# Patient Record
Sex: MALE | Race: WHITE | NOT HISPANIC OR LATINO | Employment: UNEMPLOYED | URBAN - METROPOLITAN AREA
[De-identification: names, ages, dates, MRNs, and addresses within clinical notes are randomized per-mention and may not be internally consistent; named-entity substitution may affect disease eponyms.]

---

## 2017-05-24 ENCOUNTER — TELEPHONE (OUTPATIENT)
Dept: NEUROLOGY | Facility: CLINIC | Age: 52
End: 2017-05-24

## 2017-05-24 NOTE — TELEPHONE ENCOUNTER
----- Message from Dunia Segundo sent at 5/24/2017  1:07 PM CDT -----  Contact: Patient  Patient would be a new patient, he has made an appointment to establish care, he has multiple issues, and would like to explain what the problems are that he feels he needs to be seen as soon as possible. Please call him back at [phone]. Thank you

## 2017-05-29 ENCOUNTER — OFFICE VISIT (OUTPATIENT)
Dept: INTERNAL MEDICINE | Facility: CLINIC | Age: 52
End: 2017-05-29
Payer: COMMERCIAL

## 2017-05-29 VITALS
HEIGHT: 71 IN | TEMPERATURE: 98 F | HEART RATE: 70 BPM | WEIGHT: 229.75 LBS | BODY MASS INDEX: 32.16 KG/M2 | OXYGEN SATURATION: 96 % | DIASTOLIC BLOOD PRESSURE: 71 MMHG | SYSTOLIC BLOOD PRESSURE: 111 MMHG

## 2017-05-29 DIAGNOSIS — G43.709 CHRONIC MIGRAINE WITHOUT AURA WITHOUT STATUS MIGRAINOSUS, NOT INTRACTABLE: ICD-10-CM

## 2017-05-29 DIAGNOSIS — G89.21 CHRONIC PAIN DUE TO TRAUMA: ICD-10-CM

## 2017-05-29 DIAGNOSIS — E78.5 HYPERLIPIDEMIA, UNSPECIFIED HYPERLIPIDEMIA TYPE: ICD-10-CM

## 2017-05-29 DIAGNOSIS — Z11.59 NEED FOR HEPATITIS C SCREENING TEST: ICD-10-CM

## 2017-05-29 DIAGNOSIS — Z76.89 ENCOUNTER TO ESTABLISH CARE WITH NEW DOCTOR: Primary | ICD-10-CM

## 2017-05-29 DIAGNOSIS — Z12.5 SCREENING FOR PROSTATE CANCER: ICD-10-CM

## 2017-05-29 DIAGNOSIS — E11.42 CONTROLLED TYPE 2 DIABETES MELLITUS WITH DIABETIC POLYNEUROPATHY, WITHOUT LONG-TERM CURRENT USE OF INSULIN: ICD-10-CM

## 2017-05-29 DIAGNOSIS — F14.21 COCAINE DEPENDENCE IN REMISSION: ICD-10-CM

## 2017-05-29 DIAGNOSIS — R21 RASH: ICD-10-CM

## 2017-05-29 PROBLEM — F11.21 HISTORY OF NARCOTIC ADDICTION: Status: ACTIVE | Noted: 2017-05-29

## 2017-05-29 PROCEDURE — 85025 COMPLETE CBC W/AUTO DIFF WBC: CPT

## 2017-05-29 PROCEDURE — 86803 HEPATITIS C AB TEST: CPT

## 2017-05-29 PROCEDURE — 80053 COMPREHEN METABOLIC PANEL: CPT

## 2017-05-29 PROCEDURE — 83036 HEMOGLOBIN GLYCOSYLATED A1C: CPT

## 2017-05-29 PROCEDURE — 82570 ASSAY OF URINE CREATININE: CPT

## 2017-05-29 PROCEDURE — 99999 PR PBB SHADOW E&M-EST. PATIENT-LVL V: CPT | Mod: PBBFAC,,, | Performed by: FAMILY MEDICINE

## 2017-05-29 PROCEDURE — 84443 ASSAY THYROID STIM HORMONE: CPT

## 2017-05-29 PROCEDURE — 81003 URINALYSIS AUTO W/O SCOPE: CPT

## 2017-05-29 PROCEDURE — 80061 LIPID PANEL: CPT

## 2017-05-29 PROCEDURE — 82607 VITAMIN B-12: CPT

## 2017-05-29 PROCEDURE — 84153 ASSAY OF PSA TOTAL: CPT

## 2017-05-29 PROCEDURE — 99204 OFFICE O/P NEW MOD 45 MIN: CPT | Mod: S$GLB,,, | Performed by: FAMILY MEDICINE

## 2017-05-29 RX ORDER — ATENOLOL 25 MG/1
25 TABLET ORAL DAILY
Qty: 30 TABLET | Refills: 11 | Status: SHIPPED | OUTPATIENT
Start: 2017-05-29 | End: 2018-06-12 | Stop reason: SDUPTHER

## 2017-05-29 RX ORDER — METFORMIN HYDROCHLORIDE 1000 MG/1
1000 TABLET ORAL 2 TIMES DAILY WITH MEALS
COMMUNITY
End: 2017-05-29 | Stop reason: SDUPTHER

## 2017-05-29 RX ORDER — GLIPIZIDE 5 MG/1
5 TABLET, FILM COATED, EXTENDED RELEASE ORAL
Qty: 30 TABLET | Refills: 3 | Status: SHIPPED | OUTPATIENT
Start: 2017-05-29 | End: 2018-07-09 | Stop reason: SDUPTHER

## 2017-05-29 RX ORDER — BUSPIRONE HYDROCHLORIDE 15 MG/1
15 TABLET ORAL
Refills: 0 | COMMUNITY
Start: 2017-05-24 | End: 2017-05-29 | Stop reason: SDUPTHER

## 2017-05-29 RX ORDER — CLOTRIMAZOLE 1 %
CREAM (GRAM) TOPICAL 2 TIMES DAILY
COMMUNITY
End: 2023-03-13

## 2017-05-29 RX ORDER — FENOFIBRATE 145 MG/1
145 TABLET, FILM COATED ORAL DAILY
Qty: 30 TABLET | Refills: 3 | Status: SHIPPED | OUTPATIENT
Start: 2017-05-29 | End: 2023-03-06

## 2017-05-29 RX ORDER — IBUPROFEN 800 MG/1
800 TABLET ORAL EVERY 6 HOURS PRN
Qty: 30 TABLET | Refills: 2 | Status: SHIPPED | OUTPATIENT
Start: 2017-05-29 | End: 2023-03-13

## 2017-05-29 RX ORDER — FENOFIBRATE 145 MG/1
145 TABLET, FILM COATED ORAL DAILY
Refills: 0 | COMMUNITY
Start: 2017-05-24 | End: 2017-05-29 | Stop reason: SDUPTHER

## 2017-05-29 RX ORDER — ATORVASTATIN CALCIUM 20 MG/1
20 TABLET, FILM COATED ORAL DAILY
COMMUNITY
End: 2017-05-29 | Stop reason: SDUPTHER

## 2017-05-29 RX ORDER — DICLOFENAC SODIUM 75 MG/1
75 TABLET, DELAYED RELEASE ORAL 2 TIMES DAILY
COMMUNITY
End: 2023-03-13

## 2017-05-29 RX ORDER — BETAMETHASONE VALERATE 1.2 MG/G
CREAM TOPICAL 2 TIMES DAILY
COMMUNITY
End: 2023-03-13

## 2017-05-29 RX ORDER — CEPHALEXIN 500 MG/1
500 CAPSULE ORAL DAILY
COMMUNITY
End: 2023-03-13

## 2017-05-29 RX ORDER — SUMATRIPTAN 50 MG/1
50 TABLET, FILM COATED ORAL
Refills: 0 | COMMUNITY
Start: 2017-05-22 | End: 2022-07-22

## 2017-05-29 RX ORDER — METFORMIN HYDROCHLORIDE 1000 MG/1
1000 TABLET ORAL 2 TIMES DAILY WITH MEALS
Qty: 60 TABLET | Refills: 3 | Status: SHIPPED | OUTPATIENT
Start: 2017-05-29 | End: 2018-07-09 | Stop reason: SDUPTHER

## 2017-05-29 RX ORDER — WITCH HAZEL 50 %
2000 PADS, MEDICATED (EA) TOPICAL 2 TIMES DAILY
COMMUNITY
End: 2023-03-06

## 2017-05-29 RX ORDER — TIZANIDINE 4 MG/1
4 TABLET ORAL EVERY 6 HOURS PRN
COMMUNITY
End: 2023-03-27 | Stop reason: CLARIF

## 2017-05-29 RX ORDER — BUSPIRONE HYDROCHLORIDE 15 MG/1
15 TABLET ORAL 2 TIMES DAILY
Qty: 60 TABLET | Refills: 3 | Status: SHIPPED | OUTPATIENT
Start: 2017-05-29 | End: 2023-03-13

## 2017-05-29 RX ORDER — GLIPIZIDE 5 MG/1
5 TABLET, FILM COATED, EXTENDED RELEASE ORAL
COMMUNITY
End: 2017-05-29 | Stop reason: SDUPTHER

## 2017-05-29 RX ORDER — AMOXICILLIN 500 MG
1 CAPSULE ORAL DAILY
COMMUNITY

## 2017-05-29 RX ORDER — ATORVASTATIN CALCIUM 20 MG/1
20 TABLET, FILM COATED ORAL DAILY
Qty: 30 TABLET | Refills: 3 | Status: SHIPPED | OUTPATIENT
Start: 2017-05-29 | End: 2023-03-06

## 2017-05-29 NOTE — PROGRESS NOTES
Subjective:       Patient ID: Reynaldo Chong is a 52 y.o. male.    Chief Complaint: Establish Care and Diabetes    Establish care.  No history includes chronic migraine headaches, diabetes mellitus, hyperlipidemia, previous trauma resulting in cervical fracture, left leg sciatica, cocaine addiction.  He is scheduled see neurology regards chronic migraine headaches.  He currently using Imitrex as needed.  He reports intermittent numbness of his feet.  His follow-up.  Diabetic neuropathy.  He was under pain management for trauma related injuries in 2015.  He was OxyContin.  He last took OxyContin November 2016.  He was recently discharged from cocaine addiction program.  He has a chronic rash that has been treated with Keflex 500 mg daily Lotrimin cream twice a day.  He reports the onset of the rash was in the sacral region during the time he was in prison.      Diabetes   Hypoglycemia symptoms include headaches. Pertinent negatives for hypoglycemia include no confusion, dizziness, nervousness/anxiousness or tremors. Pertinent negatives for diabetes include no chest pain, no fatigue, no polydipsia and no polyuria.     Review of Systems   Constitutional: Negative for activity change, appetite change, fatigue and unexpected weight change.   HENT: Negative for congestion, ear pain, hearing loss, sneezing, sore throat, tinnitus and trouble swallowing.    Eyes: Negative for pain, redness and visual disturbance.   Respiratory: Negative for cough, chest tightness, shortness of breath and wheezing.    Cardiovascular: Negative for chest pain, palpitations and leg swelling.   Gastrointestinal: Negative for abdominal distention, abdominal pain, blood in stool, constipation, diarrhea, nausea, rectal pain and vomiting.   Endocrine: Negative for polydipsia and polyuria.   Genitourinary: Negative for difficulty urinating, dysuria, frequency, hematuria and urgency.   Musculoskeletal: Positive for arthralgias and neck pain. Negative  for back pain, joint swelling, myalgias and neck stiffness.   Skin: Positive for rash. Negative for wound.   Neurological: Positive for headaches. Negative for dizziness, tremors, syncope, light-headedness and numbness.   Hematological: Negative for adenopathy. Does not bruise/bleed easily.   Psychiatric/Behavioral: Negative for agitation, confusion, dysphoric mood and sleep disturbance. The patient is not nervous/anxious.        Objective:      Physical Exam   Constitutional: He is oriented to person, place, and time. He appears well-developed and well-nourished.   HENT:   Right Ear: External ear normal.   Left Ear: External ear normal.   Nose: Nose normal.   Mouth/Throat: Oropharynx is clear and moist.   Eyes: Conjunctivae and EOM are normal. Pupils are equal, round, and reactive to light.   Neck: No JVD present. No thyromegaly present.   Cardiovascular: Normal rate, regular rhythm, normal heart sounds and intact distal pulses.  Exam reveals no gallop and no friction rub.    No murmur heard.  Pulses:       Dorsalis pedis pulses are 2+ on the right side, and 2+ on the left side.   Pulmonary/Chest: Effort normal and breath sounds normal. No respiratory distress. He has no wheezes. He has no rales.   Abdominal: Soft. Bowel sounds are normal. He exhibits no distension and no mass. There is no tenderness.   Musculoskeletal: Normal range of motion. He exhibits no edema or tenderness.        Right foot: There is normal range of motion and no deformity.        Left foot: There is normal range of motion and no deformity.   Feet:   Right Foot:   Protective Sensation: 10 sites tested. 4 sites sensed.   Skin Integrity: Positive for dry skin. Negative for ulcer, blister, skin breakdown, erythema, warmth or callus.   Left Foot:   Protective Sensation: 10 sites tested. 4 sites sensed.   Skin Integrity: Positive for dry skin. Negative for ulcer, blister, skin breakdown, erythema, warmth or callus.   Lymphadenopathy:     He has no  cervical adenopathy.   Neurological: He is alert and oriented to person, place, and time. He has normal reflexes. He displays normal reflexes. No cranial nerve deficit. He exhibits normal muscle tone. Coordination normal.   Skin: Skin is warm and dry. Rash noted.   He has a maculopapular type rash involving the occipital region, sacrum, right dorsal hand.   Psychiatric: He has a normal mood and affect. His behavior is normal. Judgment and thought content normal.       Lab Visit on 09/05/2013   Component Date Value Ref Range Status    Sodium 09/05/2013 138  136 - 145 mmol/L Final    Potassium 09/05/2013 4.9  3.5 - 5.1 mmol/L Final    Chloride 09/05/2013 101  95 - 110 mmol/L Final    CO2 09/05/2013 29  23 - 29 mmol/L Final    Glucose 09/05/2013 411* 70 - 110 mg/dL Final    BUN, Bld 09/05/2013 14  6 - 20 mg/dL Final    Creatinine 09/05/2013 1.2  0.5 - 1.4 mg/dL Final    Calcium 09/05/2013 10.2  8.7 - 10.5 mg/dL Final    Anion Gap 09/05/2013 8  8 - 16 mmol/L Final    eGFR if African American 09/05/2013 >60  >60 mL/min/1.73 m^2 Final    eGFR if non African American 09/05/2013 >60  >60 mL/min/1.73 m^2 Final    WBC 09/05/2013 9.20  3.90 - 12.70 K/uL Final    RBC 09/05/2013 4.85  4.60 - 6.20 M/uL Final    Hemoglobin 09/05/2013 14.6  14.0 - 18.0 g/dL Final    Hematocrit 09/05/2013 42.4  40.0 - 54.0 % Final    MCV 09/05/2013 87  82 - 98 fL Final    MCH 09/05/2013 30.1  27.0 - 31.0 pg Final    MCHC 09/05/2013 34.4  32.0 - 36.0 % Final    RDW 09/05/2013 12.8  11.5 - 14.5 % Final    Platelets 09/05/2013 318  150 - 350 K/uL Final    MPV 09/05/2013 10.8  9.2 - 12.9 fL Final    Gran # 09/05/2013 5.8  1.8 - 7.7 K/uL Final    Lymph # 09/05/2013 2.4  1.0 - 4.8 K/uL Final    Mono # 09/05/2013 0.7  0.3 - 1.0 K/uL Final    Eos # 09/05/2013 0.3  0.0 - 0.5 K/uL Final    Baso # 09/05/2013 0.03  0.00 - 0.20 K/uL Final    Gran% 09/05/2013 63.6  38.0 - 73.0 % Final    Lymph% 09/05/2013 25.6  18.0 - 48.0 % Final     Mono% 09/05/2013 7.5  4.0 - 15.0 % Final    Eosinophil% 09/05/2013 3.0  0.0 - 8.0 % Final    Basophil% 09/05/2013 0.3  0.0 - 1.9 % Final    Differential Method 09/05/2013 Automated   Final     Assessment:       1. Controlled type 2 diabetes mellitus with diabetic polyneuropathy, without long-term current use of insulin    2. Hyperlipidemia, unspecified hyperlipidemia type    3. Chronic migraine without aura without status migrainosus, not intractable    4. Rash    5. History of narcotic addiction    6. Screening for prostate cancer        Plan:   Medications were reviewed and reordered.  Recommend discontinuing Keflex.  Referral to dermatology.  Consider scabies since the initial occurrence was in prison,.  Ibuprofen as needed for migraine headache.  Atenolol 25 mg daily for possible migraine prevention.  Lab was ordered.  Optometry referral for routine diabetic eye examination.  Follow-up in one month.  He's probably developing early diabetic neuropathy of feet.  Observation for now.      Controlled type 2 diabetes mellitus with diabetic polyneuropathy, without long-term current use of insulin  -     metformin (GLUCOPHAGE) 1000 MG tablet; Take 1 tablet (1,000 mg total) by mouth 2 (two) times daily with meals.  Dispense: 60 tablet; Refill: 3  -     glipiZIDE (GLUCOTROL) 5 MG TR24; Take 1 tablet (5 mg total) by mouth daily with breakfast.  Dispense: 30 tablet; Refill: 3  -     Cancel: Ambulatory referral to Optometry  -     CBC auto differential  -     Urinalysis  -     Comprehensive metabolic panel  -     Lipid panel  -     TSH  -     Hemoglobin A1c  -     Microalbumin/creatinine urine ratio  -     Vitamin B12  -     Ambulatory Referral to Optometry    Hyperlipidemia, unspecified hyperlipidemia type  -     fenofibrate (TRICOR) 145 MG tablet; Take 1 tablet (145 mg total) by mouth once daily at 6am.  Dispense: 30 tablet; Refill: 3  -     atorvastatin (LIPITOR) 20 MG tablet; Take 1 tablet (20 mg total) by mouth once  daily.  Dispense: 30 tablet; Refill: 3    Chronic migraine without aura without status migrainosus, not intractable  -     busPIRone (BUSPAR) 15 MG tablet; Take 1 tablet (15 mg total) by mouth 2 (two) times daily.  Dispense: 60 tablet; Refill: 3  -     atenolol (TENORMIN) 25 MG tablet; Take 1 tablet (25 mg total) by mouth once daily.  Dispense: 30 tablet; Refill: 11  -     ibuprofen (ADVIL,MOTRIN) 800 MG tablet; Take 1 tablet (800 mg total) by mouth every 6 (six) hours as needed for Pain.  Dispense: 30 tablet; Refill: 2    Rash  -     Cancel: Ambulatory referral to Dermatology  -     Cancel: Ambulatory referral to Dermatology  -     Ambulatory referral to Dermatology    History of narcotic addiction    Screening for prostate cancer  -     PSA, Screening

## 2017-05-31 ENCOUNTER — TELEPHONE (OUTPATIENT)
Dept: INTERNAL MEDICINE | Facility: CLINIC | Age: 52
End: 2017-05-31

## 2017-05-31 LAB
ALBUMIN SERPL BCP-MCNC: 4 G/DL
ALP SERPL-CCNC: 57 U/L
ALT SERPL W/O P-5'-P-CCNC: 12 U/L
ANION GAP SERPL CALC-SCNC: 7 MMOL/L
AST SERPL-CCNC: 18 U/L
BASOPHILS # BLD AUTO: 0.04 K/UL
BASOPHILS NFR BLD: 0.4 %
BILIRUB SERPL-MCNC: 0.3 MG/DL
BILIRUB UR QL STRIP: NEGATIVE
BUN SERPL-MCNC: 11 MG/DL
CALCIUM SERPL-MCNC: 9.9 MG/DL
CHLORIDE SERPL-SCNC: 109 MMOL/L
CHOLEST/HDLC SERPL: 4.1 {RATIO}
CLARITY UR REFRACT.AUTO: CLEAR
CO2 SERPL-SCNC: 27 MMOL/L
COLOR UR AUTO: NORMAL
COMPLEXED PSA SERPL-MCNC: 0.38 NG/ML
CREAT SERPL-MCNC: 0.9 MG/DL
CREAT UR-MCNC: 29 MG/DL
DIFFERENTIAL METHOD: ABNORMAL
EOSINOPHIL # BLD AUTO: 0.3 K/UL
EOSINOPHIL NFR BLD: 3.3 %
ERYTHROCYTE [DISTWIDTH] IN BLOOD BY AUTOMATED COUNT: 13.6 %
EST. GFR  (AFRICAN AMERICAN): >60 ML/MIN/1.73 M^2
EST. GFR  (NON AFRICAN AMERICAN): >60 ML/MIN/1.73 M^2
GLUCOSE SERPL-MCNC: 53 MG/DL
GLUCOSE UR QL STRIP: NEGATIVE
HCT VFR BLD AUTO: 42.7 %
HDL/CHOLESTEROL RATIO: 24.3 %
HDLC SERPL-MCNC: 169 MG/DL
HDLC SERPL-MCNC: 41 MG/DL
HGB BLD-MCNC: 14 G/DL
HGB UR QL STRIP: NEGATIVE
KETONES UR QL STRIP: NEGATIVE
LDLC SERPL CALC-MCNC: 109.6 MG/DL
LEUKOCYTE ESTERASE UR QL STRIP: NEGATIVE
LYMPHOCYTES # BLD AUTO: 2.6 K/UL
LYMPHOCYTES NFR BLD: 28.7 %
MCH RBC QN AUTO: 29.5 PG
MCHC RBC AUTO-ENTMCNC: 32.8 %
MCV RBC AUTO: 90 FL
MICROALBUMIN UR DL<=1MG/L-MCNC: 9 UG/ML
MICROALBUMIN/CREATININE RATIO: 31 UG/MG
MONOCYTES # BLD AUTO: 0.7 K/UL
MONOCYTES NFR BLD: 7.7 %
NEUTROPHILS # BLD AUTO: 5.5 K/UL
NEUTROPHILS NFR BLD: 59.6 %
NITRITE UR QL STRIP: NEGATIVE
NONHDLC SERPL-MCNC: 128 MG/DL
PH UR STRIP: 7 [PH] (ref 5–8)
PLATELET # BLD AUTO: 432 K/UL
PMV BLD AUTO: 11.1 FL
POTASSIUM SERPL-SCNC: 4.2 MMOL/L
PROT SERPL-MCNC: 7.1 G/DL
PROT UR QL STRIP: NEGATIVE
RBC # BLD AUTO: 4.74 M/UL
SODIUM SERPL-SCNC: 143 MMOL/L
SP GR UR STRIP: 1 (ref 1–1.03)
TRIGL SERPL-MCNC: 92 MG/DL
TSH SERPL DL<=0.005 MIU/L-ACNC: 1.29 UIU/ML
URN SPEC COLLECT METH UR: NORMAL
UROBILINOGEN UR STRIP-ACNC: NEGATIVE EU/DL
VIT B12 SERPL-MCNC: 1904 PG/ML
WBC # BLD AUTO: 9.15 K/UL

## 2017-05-31 NOTE — TELEPHONE ENCOUNTER
----- Message from Emili Adamson sent at 5/31/2017  8:19 AM CDT -----  Contact: pt  Please call pt @ [phone], pt states he have nurse visit on 6/8, pt would like to come in this morning, pt states he have time today.

## 2017-05-31 NOTE — TELEPHONE ENCOUNTER
Spoke with pt, states that he wanted to know what he can take without giving him false readings on his drug test at his home he is at. States that he thinks it was the Ibuprofen and Aleve at the same time. Please advise.

## 2017-06-01 ENCOUNTER — TELEPHONE (OUTPATIENT)
Dept: INTERNAL MEDICINE | Facility: CLINIC | Age: 52
End: 2017-06-01

## 2017-06-01 LAB
ESTIMATED AVG GLUCOSE: 160 MG/DL
HBA1C MFR BLD HPLC: 7.2 %
HCV AB SERPL QL IA: NEGATIVE

## 2017-06-01 NOTE — TELEPHONE ENCOUNTER
----- Message from Pepito Cruz MD sent at 6/1/2017 10:34 AM CDT -----  A1c is 7.2 which is mildly elevated, B12 is high in 1904 with  normal at 950  Or less, lipids are normal , urine protein is borderline elevated, rest of lab is normal.  He is to decrease B12 1000 units to take 1 a day and needs to improve diabetic diet.  Otherwise follow-up in 3 months and will repeat lab on return.

## 2017-06-14 ENCOUNTER — TELEPHONE (OUTPATIENT)
Dept: NEUROLOGY | Facility: CLINIC | Age: 52
End: 2017-06-14

## 2017-06-14 ENCOUNTER — TELEPHONE (OUTPATIENT)
Dept: INTERNAL MEDICINE | Facility: CLINIC | Age: 52
End: 2017-06-14

## 2017-06-14 NOTE — TELEPHONE ENCOUNTER
Left message to ask if  He informed them to send over his visit to Dr. Cruz, if not we would need a consent form signed for us to get them.

## 2017-06-14 NOTE — TELEPHONE ENCOUNTER
----- Message from Madiha Batista sent at 6/14/2017 12:55 PM CDT -----  Contact: pt   Pt states that he was at the at ER at [facility] and need to get fitted in to see the doctor for a hospital f/u  for his headaches.    ..[phone] (home)

## 2017-06-14 NOTE — TELEPHONE ENCOUNTER
----- Message from Madiha Batista sent at 6/14/2017 12:50 PM CDT -----  Contact: pt   Pt states that he went to the ER at [facility] and would like the doctor to get his medical reccords so when he come to see him next week.     ..[phone] (home)

## 2017-06-14 NOTE — TELEPHONE ENCOUNTER
Called home number to speak to pt and a lady answered and said they kicked him out ,he had something in his system and they would let him know we called.

## 2017-07-16 ENCOUNTER — NURSE TRIAGE (OUTPATIENT)
Dept: ADMINISTRATIVE | Facility: CLINIC | Age: 52
End: 2017-07-16

## 2017-07-16 NOTE — TELEPHONE ENCOUNTER
Pt moved to [place]. Pt called to cancel appt for am.     Reason for Disposition   [1] Follow-up call to recent contact AND [2] information only call, no triage required    Protocols used: ST INFORMATION ONLY CALL-A-AH

## 2018-02-09 DIAGNOSIS — E11.9 TYPE 2 DIABETES MELLITUS WITHOUT COMPLICATION: ICD-10-CM

## 2018-06-12 DIAGNOSIS — G43.709 CHRONIC MIGRAINE WITHOUT AURA WITHOUT STATUS MIGRAINOSUS, NOT INTRACTABLE: ICD-10-CM

## 2018-06-12 RX ORDER — ATENOLOL 25 MG/1
TABLET ORAL
Qty: 30 TABLET | Refills: 0 | Status: SHIPPED | OUTPATIENT
Start: 2018-06-12 | End: 2018-07-08 | Stop reason: SDUPTHER

## 2018-07-08 DIAGNOSIS — G43.709 CHRONIC MIGRAINE WITHOUT AURA WITHOUT STATUS MIGRAINOSUS, NOT INTRACTABLE: ICD-10-CM

## 2018-07-08 RX ORDER — ATENOLOL 25 MG/1
TABLET ORAL
Qty: 30 TABLET | Refills: 0 | Status: SHIPPED | OUTPATIENT
Start: 2018-07-08 | End: 2018-08-08 | Stop reason: SDUPTHER

## 2018-07-09 DIAGNOSIS — E11.42 CONTROLLED TYPE 2 DIABETES MELLITUS WITH DIABETIC POLYNEUROPATHY, WITHOUT LONG-TERM CURRENT USE OF INSULIN: ICD-10-CM

## 2018-07-09 RX ORDER — METFORMIN HYDROCHLORIDE 1000 MG/1
TABLET ORAL
Qty: 60 TABLET | Refills: 5 | Status: SHIPPED | OUTPATIENT
Start: 2018-07-09 | End: 2024-01-17 | Stop reason: SDUPTHER

## 2018-07-09 RX ORDER — GLIPIZIDE 5 MG/1
TABLET, FILM COATED, EXTENDED RELEASE ORAL
Qty: 30 TABLET | Refills: 5 | Status: SHIPPED | OUTPATIENT
Start: 2018-07-09 | End: 2022-07-22

## 2018-08-08 DIAGNOSIS — G43.709 CHRONIC MIGRAINE WITHOUT AURA WITHOUT STATUS MIGRAINOSUS, NOT INTRACTABLE: ICD-10-CM

## 2018-08-08 RX ORDER — ATENOLOL 25 MG/1
TABLET ORAL
Qty: 30 TABLET | Refills: 5 | Status: SHIPPED | OUTPATIENT
Start: 2018-08-08 | End: 2023-03-06

## 2018-10-05 DIAGNOSIS — G43.709 CHRONIC MIGRAINE WITHOUT AURA WITHOUT STATUS MIGRAINOSUS, NOT INTRACTABLE: ICD-10-CM

## 2018-10-08 RX ORDER — ATENOLOL 25 MG/1
TABLET ORAL
Qty: 30 TABLET | Refills: 0 | OUTPATIENT
Start: 2018-10-08

## 2018-12-05 ENCOUNTER — NEW PATIENT (OUTPATIENT)
Dept: URBAN - METROPOLITAN AREA CLINIC 85 | Facility: CLINIC | Age: 53
End: 2018-12-05
Payer: COMMERCIAL

## 2018-12-05 DIAGNOSIS — Z79.84 LONG TERM (CURRENT) USE OF ORAL ANTIDIABETIC DRUGS: ICD-10-CM

## 2018-12-05 DIAGNOSIS — R51 HEADACHE: Primary | ICD-10-CM

## 2018-12-05 PROCEDURE — 92004 COMPRE OPH EXAM NEW PT 1/>: CPT | Performed by: OPTOMETRIST

## 2018-12-05 ASSESSMENT — KERATOMETRY
OS: 41.63
OD: 41.75

## 2018-12-05 ASSESSMENT — VISUAL ACUITY
OS: 20/20
OD: 20/20

## 2018-12-05 ASSESSMENT — INTRAOCULAR PRESSURE
OD: 12
OS: 12

## 2020-04-21 ENCOUNTER — FOLLOW UP ESTABLISHED (OUTPATIENT)
Dept: URBAN - METROPOLITAN AREA CLINIC 85 | Facility: CLINIC | Age: 55
End: 2020-04-21
Payer: COMMERCIAL

## 2020-04-21 DIAGNOSIS — H04.123 DRY EYE SYNDROME OF BILATERAL LACRIMAL GLANDS: ICD-10-CM

## 2020-04-21 DIAGNOSIS — E11.9 TYPE 2 DIABETES MELLITUS WITHOUT COMPLICATIONS: Primary | ICD-10-CM

## 2020-04-21 DIAGNOSIS — H25.13 AGE-RELATED NUCLEAR CATARACT, BILATERAL: ICD-10-CM

## 2020-04-21 PROCEDURE — 2022F DILAT RTA XM EVC RTNOPTHY: CPT | Performed by: OPTOMETRIST

## 2020-04-21 PROCEDURE — 92014 COMPRE OPH EXAM EST PT 1/>: CPT | Performed by: OPTOMETRIST

## 2020-04-21 ASSESSMENT — INTRAOCULAR PRESSURE
OD: 11
OS: 11

## 2020-04-21 ASSESSMENT — KERATOMETRY
OD: 42.00
OS: 41.75

## 2020-04-21 ASSESSMENT — VISUAL ACUITY
OD: 20/20
OS: 20/20

## 2021-04-28 ENCOUNTER — OFFICE VISIT (OUTPATIENT)
Dept: URBAN - METROPOLITAN AREA CLINIC 85 | Facility: CLINIC | Age: 56
End: 2021-04-28
Payer: COMMERCIAL

## 2021-04-28 PROCEDURE — 92014 COMPRE OPH EXAM EST PT 1/>: CPT | Performed by: OPTOMETRIST

## 2021-04-28 ASSESSMENT — VISUAL ACUITY
OS: 20/20
OD: 20/20

## 2021-04-28 ASSESSMENT — INTRAOCULAR PRESSURE
OD: 12
OS: 13

## 2021-04-28 NOTE — IMPRESSION/PLAN
Impression: Type 2 diabetes mellitus without complications: V38.3. Plan: No diabetic retinopathy. Recommend yearly diabetic eye exam. Discussed with patient importance of good blood sugar control and compliance with regular visits with PCP, compliance with medications, healthy diet and daily exercise.

## 2021-04-28 NOTE — IMPRESSION/PLAN
Impression: Age-related nuclear cataract, bilateral: H25.13. Plan: No treatment currently recommended due to level of vision. Patient will monitor for vision changes and contact us with any decrease in vision. Will re-evaluate cataract on return visit.  Recommend UV protection/sunglasses and update spectacle RX PRN

## 2022-07-22 PROBLEM — Z95.818 STATUS POST PLACEMENT OF IMPLANTABLE LOOP RECORDER: Status: ACTIVE | Noted: 2022-07-22

## 2022-07-22 PROBLEM — I87.2 CHRONIC VENOUS INSUFFICIENCY OF LOWER EXTREMITY: Status: ACTIVE | Noted: 2022-07-22

## 2022-07-22 PROBLEM — E78.00 HYPERCHOLESTEREMIA: Status: ACTIVE | Noted: 2017-05-29

## 2022-07-22 PROBLEM — I10 PRIMARY HYPERTENSION: Status: ACTIVE | Noted: 2022-07-22

## 2022-12-21 ENCOUNTER — TELEPHONE (OUTPATIENT)
Dept: UROLOGY | Facility: CLINIC | Age: 57
End: 2022-12-21
Payer: MEDICAID

## 2022-12-21 NOTE — TELEPHONE ENCOUNTER
----- Message from Autumn Calderon sent at 12/21/2022 12:30 PM CST -----  Nichole Jackman NP would like to refer the patient to the Urology department. The patients diagnosis is  benign prostatic hyperplasia without lower urinary tract symptoms. Patient has hx of urolift procedure - pt states needs follow up.    I have scanned the patients referral and records into .     Please review and contact patient to schedule appointment.    Thank you,   Autumn Hurst

## 2023-01-17 NOTE — PROGRESS NOTES
"Subjective:      Reynaldo Chong is a 57 y.o. male who presents for evaluation of BPH.      Benign Prostatic Hypertrophy  Patient complains of lower urinary tract symptoms. He reports nocturia 3x per night, daytime frequency, intermittency, straining, and urgency. He denies weak stream. Patient states symptoms are of severe severity. Onset of symptoms was several weeks ago and was unknown in onset. His AUA Symptom Score is, 19/5 . He has no personal history and no family history of prostate cancer. He denies flank pain, gross hematuria, kidney stones, and recurrent UTI.    He reports having UroLift done by Dr. Reyes in [place] several years ago.  He also reports history of circumcision revision; will request records.  +DMII; +HSV; +migraines; possible sleep apnea- no previous workup     Also reports erectile dysfunction; present for many years.  No previous treatment.    The following portions of the patient's history were reviewed and updated as appropriate: allergies, current medications, past family history, past medical history, past social history, past surgical history and problem list.    Review of Systems  Constitutional: no fever or chills  ENT: no nasal congestion or sore throat  Respiratory: no cough or shortness of breath  Cardiovascular: no chest pain or palpitations  Gastrointestinal: no nausea or vomiting, tolerating diet  Genitourinary: as per HPI  Hematologic/Lymphatic: no easy bruising or lymphadenopathy  Musculoskeletal: no arthralgias or myalgias  Neurological: no seizures or tremors  Behavioral/Psych: no auditory or visual hallucinations     Objective:   Vitals: BP (!) 141/87 (BP Location: Right arm, Patient Position: Sitting, BP Method: Large (Automatic))   Pulse 72   Ht 5' 11" (1.803 m)   Wt 112.8 kg (248 lb 10.9 oz)   BMI 34.68 kg/m²     Physical Exam   General: alert and oriented, no acute distress  Head: normocephalic, atraumatic  Neck: supple, no lymphadenopathy, normal ROM, no " masses  Respiratory: Symmetric expansion, non-labored breathing  Cardiovascular: regular rate and rhythm, nomal pulses, no peripheral edema  Abdomen: soft, non tender  CHRISTIAN: decline  Skin: no suspicious skin lesions noted  Neuro: alert and oriented x3, no gross deficits  Psych: normal judgment and insight, normal mood/affect, and non-anxious    Physical Exam    Lab Review   Urinalysis demonstrates negative for all components  Lab Results   Component Value Date    WBC 9.15 05/31/2017    HGB 14.0 05/31/2017    HCT 42.7 05/31/2017    MCV 90 05/31/2017     (H) 05/31/2017     Lab Results   Component Value Date    CREATININE 0.9 05/31/2017    BUN 11 05/31/2017     Lab Results   Component Value Date    PSA 0.38 05/31/2017     Lab Results   Component Value Date    PSA 0.38 05/31/2017       Bladder Scan PVR: 0 cc     Assessment and Plan:   1. Benign prostatic hyperplasia with urinary frequency  --UA and UC today  --PSA NA  --discussed diagnostic cystoscope versus medication therapy  --patient wishes to proceed with Flomax    2. Other male erectile dysfunction  --Reviewed pathophysiology and differential diagnosis of erectile dysfunction with the patient. Treatment options, including relative risks and benefits, were discussed. All questions were answered.  - tadalafiL (CIALIS) 20 MG Tab; Take 1 tablet (20 mg total) by mouth daily as needed (ED).  Dispense: 12 tablet; Refill: 11       --rtc in 1 month for medication assessment     This note is dictated on M*Modal word recognition program.  There are word recognition mistakes that are occasionally missed on review.

## 2023-01-18 ENCOUNTER — OFFICE VISIT (OUTPATIENT)
Dept: UROLOGY | Facility: CLINIC | Age: 58
End: 2023-01-18
Payer: MEDICAID

## 2023-01-18 VITALS
DIASTOLIC BLOOD PRESSURE: 87 MMHG | WEIGHT: 248.69 LBS | HEIGHT: 71 IN | SYSTOLIC BLOOD PRESSURE: 141 MMHG | BODY MASS INDEX: 34.81 KG/M2 | HEART RATE: 72 BPM

## 2023-01-18 DIAGNOSIS — N40.1 BENIGN PROSTATIC HYPERPLASIA WITH URINARY FREQUENCY: Primary | ICD-10-CM

## 2023-01-18 DIAGNOSIS — R35.0 BENIGN PROSTATIC HYPERPLASIA WITH URINARY FREQUENCY: Primary | ICD-10-CM

## 2023-01-18 DIAGNOSIS — N52.8 OTHER MALE ERECTILE DYSFUNCTION: ICD-10-CM

## 2023-01-18 LAB
BILIRUB SERPL-MCNC: NEGATIVE MG/DL
BLOOD URINE, POC: NEGATIVE
CLARITY, POC UA: CLEAR
COLOR, POC UA: YELLOW
GLUCOSE UR QL STRIP: NEGATIVE
KETONES UR QL STRIP: NEGATIVE
LEUKOCYTE ESTERASE URINE, POC: NEGATIVE
MICROSCOPIC COMMENT: NORMAL
NITRITE, POC UA: NEGATIVE
PH, POC UA: 6.5
POC RESIDUAL URINE VOLUME: 0 ML (ref 0–100)
PROTEIN, POC: NEGATIVE
RBC #/AREA URNS HPF: 0 /HPF (ref 0–4)
SPECIFIC GRAVITY, POC UA: 1.01
UROBILINOGEN, POC UA: NORMAL
WBC #/AREA URNS HPF: 2 /HPF (ref 0–5)

## 2023-01-18 PROCEDURE — 81002 URINALYSIS NONAUTO W/O SCOPE: CPT | Mod: PBBFAC,PN | Performed by: NURSE PRACTITIONER

## 2023-01-18 PROCEDURE — 99999 PR PBB SHADOW E&M-EST. PATIENT-LVL V: CPT | Mod: PBBFAC,,, | Performed by: NURSE PRACTITIONER

## 2023-01-18 PROCEDURE — 51798 US URINE CAPACITY MEASURE: CPT | Mod: PBBFAC,PN | Performed by: NURSE PRACTITIONER

## 2023-01-18 PROCEDURE — 3077F PR MOST RECENT SYSTOLIC BLOOD PRESSURE >= 140 MM HG: ICD-10-PCS | Mod: CPTII,,, | Performed by: NURSE PRACTITIONER

## 2023-01-18 PROCEDURE — 87086 URINE CULTURE/COLONY COUNT: CPT | Performed by: NURSE PRACTITIONER

## 2023-01-18 PROCEDURE — 3079F DIAST BP 80-89 MM HG: CPT | Mod: CPTII,,, | Performed by: NURSE PRACTITIONER

## 2023-01-18 PROCEDURE — 1160F RVW MEDS BY RX/DR IN RCRD: CPT | Mod: CPTII,,, | Performed by: NURSE PRACTITIONER

## 2023-01-18 PROCEDURE — 99215 OFFICE O/P EST HI 40 MIN: CPT | Mod: PBBFAC,PN | Performed by: NURSE PRACTITIONER

## 2023-01-18 PROCEDURE — 99999 PR PBB SHADOW E&M-EST. PATIENT-LVL V: ICD-10-PCS | Mod: PBBFAC,,, | Performed by: NURSE PRACTITIONER

## 2023-01-18 PROCEDURE — 1159F PR MEDICATION LIST DOCUMENTED IN MEDICAL RECORD: ICD-10-PCS | Mod: CPTII,,, | Performed by: NURSE PRACTITIONER

## 2023-01-18 PROCEDURE — 3008F PR BODY MASS INDEX (BMI) DOCUMENTED: ICD-10-PCS | Mod: CPTII,,, | Performed by: NURSE PRACTITIONER

## 2023-01-18 PROCEDURE — 99204 OFFICE O/P NEW MOD 45 MIN: CPT | Mod: S$PBB,,, | Performed by: NURSE PRACTITIONER

## 2023-01-18 PROCEDURE — 3008F BODY MASS INDEX DOCD: CPT | Mod: CPTII,,, | Performed by: NURSE PRACTITIONER

## 2023-01-18 PROCEDURE — 1159F MED LIST DOCD IN RCRD: CPT | Mod: CPTII,,, | Performed by: NURSE PRACTITIONER

## 2023-01-18 PROCEDURE — 3079F PR MOST RECENT DIASTOLIC BLOOD PRESSURE 80-89 MM HG: ICD-10-PCS | Mod: CPTII,,, | Performed by: NURSE PRACTITIONER

## 2023-01-18 PROCEDURE — 1160F PR REVIEW ALL MEDS BY PRESCRIBER/CLIN PHARMACIST DOCUMENTED: ICD-10-PCS | Mod: CPTII,,, | Performed by: NURSE PRACTITIONER

## 2023-01-18 PROCEDURE — 3077F SYST BP >= 140 MM HG: CPT | Mod: CPTII,,, | Performed by: NURSE PRACTITIONER

## 2023-01-18 PROCEDURE — 99204 PR OFFICE/OUTPT VISIT, NEW, LEVL IV, 45-59 MIN: ICD-10-PCS | Mod: S$PBB,,, | Performed by: NURSE PRACTITIONER

## 2023-01-18 RX ORDER — TAMSULOSIN HYDROCHLORIDE 0.4 MG/1
0.4 CAPSULE ORAL DAILY
Qty: 30 CAPSULE | Refills: 11 | Status: SHIPPED | OUTPATIENT
Start: 2023-01-18 | End: 2023-09-28

## 2023-01-18 RX ORDER — TADALAFIL 20 MG/1
20 TABLET ORAL DAILY PRN
Qty: 12 TABLET | Refills: 11 | Status: SHIPPED | OUTPATIENT
Start: 2023-01-18 | End: 2023-06-14

## 2023-01-18 RX ORDER — RIZATRIPTAN BENZOATE 10 MG/1
10 TABLET ORAL ONCE AS NEEDED
Status: ON HOLD | COMMUNITY
Start: 2023-01-02 | End: 2023-09-22 | Stop reason: CLARIF

## 2023-01-20 LAB — BACTERIA UR CULT: NO GROWTH

## 2023-02-02 ENCOUNTER — OFFICE VISIT (OUTPATIENT)
Dept: CARDIOLOGY | Facility: CLINIC | Age: 58
End: 2023-02-02
Payer: MEDICAID

## 2023-02-02 VITALS
SYSTOLIC BLOOD PRESSURE: 150 MMHG | WEIGHT: 252.19 LBS | BODY MASS INDEX: 35.31 KG/M2 | HEIGHT: 71 IN | OXYGEN SATURATION: 95 % | HEART RATE: 82 BPM | DIASTOLIC BLOOD PRESSURE: 81 MMHG

## 2023-02-02 DIAGNOSIS — I10 PRIMARY HYPERTENSION: ICD-10-CM

## 2023-02-02 DIAGNOSIS — Z98.890 HISTORY OF LOOP RECORDER: Primary | ICD-10-CM

## 2023-02-02 PROCEDURE — 3079F DIAST BP 80-89 MM HG: CPT | Mod: CPTII,,, | Performed by: NURSE PRACTITIONER

## 2023-02-02 PROCEDURE — 1159F MED LIST DOCD IN RCRD: CPT | Mod: CPTII,,, | Performed by: NURSE PRACTITIONER

## 2023-02-02 PROCEDURE — 3079F PR MOST RECENT DIASTOLIC BLOOD PRESSURE 80-89 MM HG: ICD-10-PCS | Mod: CPTII,,, | Performed by: NURSE PRACTITIONER

## 2023-02-02 PROCEDURE — 93010 EKG 12-LEAD: ICD-10-PCS | Mod: S$PBB,,, | Performed by: INTERNAL MEDICINE

## 2023-02-02 PROCEDURE — 99202 OFFICE O/P NEW SF 15 MIN: CPT | Mod: 25,S$PBB,, | Performed by: NURSE PRACTITIONER

## 2023-02-02 PROCEDURE — 99999 PR PBB SHADOW E&M-EST. PATIENT-LVL V: ICD-10-PCS | Mod: PBBFAC,,, | Performed by: NURSE PRACTITIONER

## 2023-02-02 PROCEDURE — 1160F PR REVIEW ALL MEDS BY PRESCRIBER/CLIN PHARMACIST DOCUMENTED: ICD-10-PCS | Mod: CPTII,,, | Performed by: NURSE PRACTITIONER

## 2023-02-02 PROCEDURE — 99215 OFFICE O/P EST HI 40 MIN: CPT | Mod: PBBFAC,PN | Performed by: NURSE PRACTITIONER

## 2023-02-02 PROCEDURE — 3008F PR BODY MASS INDEX (BMI) DOCUMENTED: ICD-10-PCS | Mod: CPTII,,, | Performed by: NURSE PRACTITIONER

## 2023-02-02 PROCEDURE — 3077F PR MOST RECENT SYSTOLIC BLOOD PRESSURE >= 140 MM HG: ICD-10-PCS | Mod: CPTII,,, | Performed by: NURSE PRACTITIONER

## 2023-02-02 PROCEDURE — 93005 ELECTROCARDIOGRAM TRACING: CPT | Mod: PBBFAC,PN | Performed by: INTERNAL MEDICINE

## 2023-02-02 PROCEDURE — 3008F BODY MASS INDEX DOCD: CPT | Mod: CPTII,,, | Performed by: NURSE PRACTITIONER

## 2023-02-02 PROCEDURE — 3077F SYST BP >= 140 MM HG: CPT | Mod: CPTII,,, | Performed by: NURSE PRACTITIONER

## 2023-02-02 PROCEDURE — 99202 PR OFFICE/OUTPT VISIT, NEW, LEVL II, 15-29 MIN: ICD-10-PCS | Mod: 25,S$PBB,, | Performed by: NURSE PRACTITIONER

## 2023-02-02 PROCEDURE — 1160F RVW MEDS BY RX/DR IN RCRD: CPT | Mod: CPTII,,, | Performed by: NURSE PRACTITIONER

## 2023-02-02 PROCEDURE — 99999 PR PBB SHADOW E&M-EST. PATIENT-LVL V: CPT | Mod: PBBFAC,,, | Performed by: NURSE PRACTITIONER

## 2023-02-02 PROCEDURE — 93010 ELECTROCARDIOGRAM REPORT: CPT | Mod: S$PBB,,, | Performed by: INTERNAL MEDICINE

## 2023-02-02 PROCEDURE — 1159F PR MEDICATION LIST DOCUMENTED IN MEDICAL RECORD: ICD-10-PCS | Mod: CPTII,,, | Performed by: NURSE PRACTITIONER

## 2023-02-02 RX ORDER — CEFDINIR 300 MG/1
300 CAPSULE ORAL 2 TIMES DAILY
COMMUNITY
Start: 2023-01-31 | End: 2023-03-13

## 2023-02-02 RX ORDER — BLOOD SUGAR DIAGNOSTIC
STRIP MISCELLANEOUS
COMMUNITY
Start: 2023-01-30 | End: 2023-06-22 | Stop reason: SDUPTHER

## 2023-02-02 RX ORDER — ATORVASTATIN CALCIUM 80 MG/1
40 TABLET, FILM COATED ORAL NIGHTLY
COMMUNITY
Start: 2023-01-18

## 2023-02-02 RX ORDER — GALCANEZUMAB 120 MG/ML
INJECTION, SOLUTION SUBCUTANEOUS
COMMUNITY
Start: 2023-01-18

## 2023-02-02 RX ORDER — FELODIPINE 2.5 MG/1
1 TABLET, EXTENDED RELEASE ORAL DAILY
COMMUNITY
End: 2023-03-13

## 2023-02-02 RX ORDER — BUTALBITAL, ACETAMINOPHEN AND CAFFEINE 50; 325; 40 MG/1; MG/1; MG/1
1 TABLET ORAL EVERY 6 HOURS PRN
COMMUNITY
Start: 2023-01-27 | End: 2023-09-28

## 2023-02-02 RX ORDER — AMITRIPTYLINE HYDROCHLORIDE 10 MG/1
20 TABLET, FILM COATED ORAL NIGHTLY
COMMUNITY
Start: 2023-01-20

## 2023-02-02 RX ORDER — VALACYCLOVIR HYDROCHLORIDE 1 G/1
TABLET, FILM COATED ORAL
COMMUNITY
End: 2023-03-27 | Stop reason: CLARIF

## 2023-02-02 NOTE — PATIENT INSTRUCTIONS
We will request your records from the cardiologist in [place]    I would like to have the Loop Recorder checked to make sure there is no abnormal heart rhythm    Based on the results we will get you set up with Dr. Becker

## 2023-02-02 NOTE — PROGRESS NOTES
Cardiology    2/2/2023  2:52 PM    Problem list  Patient Active Problem List   Diagnosis    Screening for prostate cancer    Rash    Encounter to establish care with new doctor    Hypercholesteremia    Controlled type 2 diabetes mellitus with diabetic polyneuropathy, without long-term current use of insulin    Chronic migraine without aura without status migrainosus, not intractable    History of narcotic addiction    Cocaine dependence in remission    Chronic pain due to trauma    Status post placement of implantable loop recorder    Primary hypertension    Chronic venous insufficiency of lower extremity       CC:  H/O ILR    HPI:  Interested in getting his ILR out.  Saw  a cardiologist in [place].  ILR was paced in December 2020.  Had a neurologist appt and came back from the drive after having leg swelling.  A cardiologist saw him and then he had vascular surgery on both legs, then had a repeat surgery in right ankle.  Moved back to [place] in March 2020.  The reason for the ILR was unknown.  Keeps BP log.  Will lose 30 minutes (sometimes 3 hours) each day.  Was found to have an elevated WBC- woke up Sunday morning and went to the  and was diagnosed with an ear infection. It is fungal and will getting different ear drops. He was followed by a local neurology group but is not able to be followed in their clinic anymore as they overcharged for an injection. He was having steroid injections for migraines.   Has a sleep study pending, may be able to hear himself snore but is unsire.      Medications  Current Outpatient Medications   Medication Sig Dispense Refill    ACCU-CHEK GUIDE TEST STRIPS Strp USE TO check blood sugar ONCE DAILY      amitriptyline (ELAVIL) 10 MG tablet Take 20 mg by mouth every evening.      amLODIPine (NORVASC) 2.5 MG tablet Take 2.5 mg by mouth once daily.      aspirin (ECOTRIN) 81 MG EC tablet Take 81 mg by mouth once daily.      atorvastatin (LIPITOR) 80 MG tablet Take 80 mg by  mouth.      butalbital-acetaminophen-caffeine -40 mg (FIORICET, ESGIC) -40 mg per tablet Take by mouth.      cefdinir (OMNICEF) 300 MG capsule Take 300 mg by mouth 2 (two) times daily.      EMGALITY  mg/mL PnIj Inject into the skin every 30 days.      fish oil-omega-3 fatty acids 300-1,000 mg capsule Take 1 g by mouth 2 (two) times daily.      metFORMIN (GLUCOPHAGE) 1000 MG tablet TAKE 1 TABLET BY MOUTH TWICE DAILY WITH MEALS 60 tablet 5    multivitamin capsule Take 1 capsule by mouth once daily.      NURTEC 75 mg odt PLACE 1 TABLET BY MOUTH ON THE TONGUE AND ALLOW TO DISSOLVE EVERY DAY      pioglitazone-glimepiride (DUETACT) 30-2 mg per tablet Take 1 tablet by mouth once daily.      rizatriptan (MAXALT) 10 MG tablet Take 10 mg by mouth.      tamsulosin (FLOMAX) 0.4 mg Cap Take 1 capsule (0.4 mg total) by mouth once daily. 30 capsule 11    topiramate (TOPAMAX) 100 MG tablet Take 100 mg by mouth 2 (two) times daily.      valACYclovir (VALTREX) 1000 MG tablet PRN      valACYclovir (VALTREX) 500 MG tablet Take 500 mg by mouth once daily.      amitriptyline (ELAVIL) 25 MG tablet Take 25 mg by mouth nightly.      atenolol (TENORMIN) 25 MG tablet TAKE 1 TABLET BY MOUTH EVERY DAY (Patient not taking: Reported on 1/18/2023) 30 tablet 5    atorvastatin (LIPITOR) 20 MG tablet Take 1 tablet (20 mg total) by mouth once daily. (Patient taking differently: Take 40 mg by mouth once daily.) 30 tablet 3    betamethasone valerate 0.1% (VALISONE) 0.1 % Crea Apply topically 2 (two) times daily.      busPIRone (BUSPAR) 15 MG tablet Take 1 tablet (15 mg total) by mouth 2 (two) times daily. (Patient not taking: Reported on 1/18/2023) 60 tablet 3    cephALEXin (KEFLEX) 500 MG capsule Take 500 mg by mouth once daily at 6am.      clotrimazole (LOTRIMIN) 1 % cream Apply topically 2 (two) times daily.      cyanocobalamin 2000 MCG tablet Take 2,000 mcg by mouth 2 (two) times daily.      diclofenac (VOLTAREN) 75 MG EC tablet  Take 75 mg by mouth 2 (two) times daily.      felodipine (PLENDIL) 2.5 MG Tb24 Take 1 tablet by mouth once daily.      fenofibrate (TRICOR) 145 MG tablet Take 1 tablet (145 mg total) by mouth once daily at 6am. (Patient not taking: Reported on 1/18/2023) 30 tablet 3    ibuprofen (ADVIL,MOTRIN) 800 MG tablet Take 1 tablet (800 mg total) by mouth every 6 (six) hours as needed for Pain. (Patient not taking: Reported on 2/2/2023) 30 tablet 2    tadalafiL (CIALIS) 20 MG Tab Take 1 tablet (20 mg total) by mouth daily as needed (ED). 12 tablet 11    tizanidine (ZANAFLEX) 4 MG tablet Take 4 mg by mouth every 6 (six) hours as needed.      TRUE METRIX AIR GLUCOSE METER Misc USE TO CHECK BLOOD GLUCOSE AS DIRECTED      TRUEPLUS LANCETS 33 gauge Misc USE TO CHECK BLOOD SUGAR EVERY DAY AS NEEDED       No current facility-administered medications for this visit.      Prior to Admission medications    Medication Sig Start Date End Date Taking? Authorizing Provider   ACCU-CHEK GUIDE TEST STRIPS Strp USE TO check blood sugar ONCE DAILY 1/30/23  Yes Historical Provider   amitriptyline (ELAVIL) 10 MG tablet Take 20 mg by mouth every evening. 1/20/23  Yes Historical Provider   amLODIPine (NORVASC) 2.5 MG tablet Take 2.5 mg by mouth once daily. 7/17/22  Yes Historical Provider   aspirin (ECOTRIN) 81 MG EC tablet Take 81 mg by mouth once daily.   Yes Historical Provider   atorvastatin (LIPITOR) 80 MG tablet Take 80 mg by mouth. 1/18/23  Yes Historical Provider   butalbital-acetaminophen-caffeine -40 mg (FIORICET, ESGIC) -40 mg per tablet Take by mouth. 1/27/23  Yes Historical Provider   cefdinir (OMNICEF) 300 MG capsule Take 300 mg by mouth 2 (two) times daily. 1/31/23  Yes Historical Provider   EMGALITY  mg/mL PnIj Inject into the skin every 30 days. 1/18/23  Yes Historical Provider   fish oil-omega-3 fatty acids 300-1,000 mg capsule Take 1 g by mouth 2 (two) times daily.   Yes Historical Provider   metFORMIN  (GLUCOPHAGE) 1000 MG tablet TAKE 1 TABLET BY MOUTH TWICE DAILY WITH MEALS 7/9/18  Yes Pepito Cruz MD   multivitamin capsule Take 1 capsule by mouth once daily.   Yes Historical Provider   NURTEC 75 mg odt PLACE 1 TABLET BY MOUTH ON THE TONGUE AND ALLOW TO DISSOLVE EVERY DAY 7/16/22  Yes Historical Provider   pioglitazone-glimepiride (DUETACT) 30-2 mg per tablet Take 1 tablet by mouth once daily. 6/22/22  Yes Historical Provider   rizatriptan (MAXALT) 10 MG tablet Take 10 mg by mouth. 1/2/23  Yes Historical Provider   tamsulosin (FLOMAX) 0.4 mg Cap Take 1 capsule (0.4 mg total) by mouth once daily. 1/18/23 2/17/23 Yes Sabra Leo NP   topiramate (TOPAMAX) 100 MG tablet Take 100 mg by mouth 2 (two) times daily. 7/16/22  Yes Historical Provider   valACYclovir (VALTREX) 1000 MG tablet PRN   Yes Historical Provider   valACYclovir (VALTREX) 500 MG tablet Take 500 mg by mouth once daily.   Yes Historical Provider   amitriptyline (ELAVIL) 25 MG tablet Take 25 mg by mouth nightly. 7/17/22   Historical Provider   atenolol (TENORMIN) 25 MG tablet TAKE 1 TABLET BY MOUTH EVERY DAY  Patient not taking: Reported on 1/18/2023 8/8/18   Pepito Cruz MD   atorvastatin (LIPITOR) 20 MG tablet Take 1 tablet (20 mg total) by mouth once daily.  Patient taking differently: Take 40 mg by mouth once daily. 5/29/17   Pepito Cruz MD   betamethasone valerate 0.1% (VALISONE) 0.1 % Crea Apply topically 2 (two) times daily.    Historical Provider   busPIRone (BUSPAR) 15 MG tablet Take 1 tablet (15 mg total) by mouth 2 (two) times daily.  Patient not taking: Reported on 1/18/2023 5/29/17   Pepito Cruz MD   cephALEXin (KEFLEX) 500 MG capsule Take 500 mg by mouth once daily at 6am.    Historical Provider   clotrimazole (LOTRIMIN) 1 % cream Apply topically 2 (two) times daily.    Historical Provider   cyanocobalamin 2000 MCG tablet Take 2,000 mcg by mouth 2 (two) times daily.    Historical Provider   diclofenac (VOLTAREN) 75  MG EC tablet Take 75 mg by mouth 2 (two) times daily.    Historical Provider   felodipine (PLENDIL) 2.5 MG Tb24 Take 1 tablet by mouth once daily.    Historical Provider   fenofibrate (TRICOR) 145 MG tablet Take 1 tablet (145 mg total) by mouth once daily at 6am.  Patient not taking: Reported on 1/18/2023 5/29/17   Pepito Cruz MD   ibuprofen (ADVIL,MOTRIN) 800 MG tablet Take 1 tablet (800 mg total) by mouth every 6 (six) hours as needed for Pain.  Patient not taking: Reported on 2/2/2023 5/29/17   Pepito Cruz MD   tadalafiL (CIALIS) 20 MG Tab Take 1 tablet (20 mg total) by mouth daily as needed (ED). 1/18/23 1/28/23  Sabra Leo NP   tizanidine (ZANAFLEX) 4 MG tablet Take 4 mg by mouth every 6 (six) hours as needed.    Historical Provider   TRUE METRIX AIR GLUCOSE METER Misc USE TO CHECK BLOOD GLUCOSE AS DIRECTED 6/23/22   Historical Provider   TRUEPLUS LANCETS 33 gauge Misc USE TO CHECK BLOOD SUGAR EVERY DAY AS NEEDED 7/7/22   Historical Provider         History  Past Medical History:   Diagnosis Date    Anxiety     Diabetes mellitus, type 2      Past Surgical History:   Procedure Laterality Date    broken arm  Right 1976    COLONOSCOPY  2016    ELBOW DEBRIDEMENT Left 2013    bone spur     KNEE CARTILAGE SURGERY Right 1997    NECK SURGERY  2016    c4 c5 c6 plate     SHOULDER ARTHROSCOPY Right 2015    reconstruction     THUMB ARTHROSCOPY Right 2015    reconstructive     TONSILLECTOMY  1969     Social History     Socioeconomic History    Marital status: Single   Tobacco Use    Smoking status: Former     Packs/day: 0.30     Types: Cigarettes    Smokeless tobacco: Never   Substance and Sexual Activity    Alcohol use: No     Comment: hx of     Drug use: No     Comment: hx of     Sexual activity: Not Currently     Partners: Female         Allergies  Review of patient's allergies indicates:   Allergen Reactions    Pcn [penicillins]          Review of Systems   Review of Systems   Constitutional: Negative  "for diaphoresis and malaise/fatigue.   HENT: Negative.     Cardiovascular:  Positive for chest pain ("I eat too much- period"), dyspnea on exertion (rare) and leg swelling (wears compression stockings 3-4 days a week). Negative for claudication, irregular heartbeat, near-syncope, orthopnea, palpitations, paroxysmal nocturnal dyspnea and syncope.   Respiratory:  Negative for shortness of breath.    Endocrine: Negative for polydipsia, polyphagia and polyuria.   Hematologic/Lymphatic: Does not bruise/bleed easily.   Gastrointestinal:  Negative for bloating, nausea and vomiting.   Genitourinary: Negative.    Neurological:  Positive for dizziness and loss of balance ("I lose time, 30 minutes to 3 hours"). Negative for excessive daytime sleepiness, light-headedness and weakness.   Psychiatric/Behavioral:  The patient is not nervous/anxious.    Allergic/Immunologic: Negative.        Physical Exam  Wt Readings from Last 1 Encounters:   02/02/23 114.4 kg (252 lb 3.3 oz)     BP Readings from Last 3 Encounters:   02/02/23 (!) 150/81   01/18/23 (!) 141/87   08/05/22 (!) 146/82     Pulse Readings from Last 1 Encounters:   02/02/23 82     Body mass index is 35.18 kg/m².    Physical Exam  Vitals and nursing note reviewed.   Constitutional:       Appearance: Normal appearance.   HENT:      Head: Normocephalic and atraumatic.      Mouth/Throat:      Mouth: Mucous membranes are moist.   Eyes:      Pupils: Pupils are equal, round, and reactive to light.   Cardiovascular:      Rate and Rhythm: Normal rate and regular rhythm.      Pulses:           Radial pulses are 2+ on the right side and 2+ on the left side.        Dorsalis pedis pulses are 2+ on the right side and 2+ on the left side.        Posterior tibial pulses are 2+ on the right side and 2+ on the left side.      Heart sounds: No murmur heard.  Pulmonary:      Effort: Pulmonary effort is normal. No respiratory distress.      Breath sounds: Normal breath sounds.   Abdominal:    "   General: There is no distension.      Tenderness: There is no abdominal tenderness.   Musculoskeletal:      Cervical back: Normal range of motion.      Right lower leg: No edema.      Left lower leg: No edema.   Skin:     General: Skin is warm and dry.      Findings: No erythema.   Neurological:      General: No focal deficit present.      Mental Status: He is alert.   Psychiatric:         Mood and Affect: Mood normal.         Behavior: Behavior normal.      Comments: tangential       Problem List Items Addressed This Visit          Cardiac/Vascular    Primary hypertension    Overview     Continue current medications         Current Assessment & Plan     As above         Relevant Orders    IN OFFICE EKG 12-LEAD (to [facility])       Other    History of loop recorder - Primary    Overview       Requesting removal  He is unable to describe any symptoms he may be having- recommended interrogation prior as he is unable to recall why it was placed  Refer to Dr. Becker for removal         Current Assessment & Plan     As above         Relevant Orders    Ambulatory referral/consult to Interventional Cardiology               Follow Up  Set up for interrogation but will refer to Dr. Becker as pt is requesting immediate removal      @Katelyn Singh DNP

## 2023-02-03 PROBLEM — Z98.890 HISTORY OF LOOP RECORDER: Status: ACTIVE | Noted: 2022-07-22

## 2023-02-06 ENCOUNTER — CLINICAL SUPPORT (OUTPATIENT)
Dept: CARDIOLOGY | Facility: CLINIC | Age: 58
End: 2023-02-06
Payer: MEDICAID

## 2023-02-06 ENCOUNTER — TELEPHONE (OUTPATIENT)
Dept: CARDIOLOGY | Facility: CLINIC | Age: 58
End: 2023-02-06

## 2023-02-06 DIAGNOSIS — Z98.890 HISTORY OF LOOP RECORDER: Primary | ICD-10-CM

## 2023-02-06 PROCEDURE — 93291 PR INTERROG EVAL, IN PERSON,IMPLANT LOOP REC: ICD-10-PCS | Mod: 26,S$PBB,, | Performed by: INTERNAL MEDICINE

## 2023-02-06 PROCEDURE — 93291 INTERROG DEV EVAL SCRMS IP: CPT | Mod: 26,S$PBB,, | Performed by: INTERNAL MEDICINE

## 2023-02-06 PROCEDURE — 93291 INTERROG DEV EVAL SCRMS IP: CPT | Mod: PBBFAC,PN | Performed by: INTERNAL MEDICINE

## 2023-02-06 NOTE — PROGRESS NOTES
Subjective:      Patient ID: Reynaldo Chong is a 57 y.o. male.    Chief Complaint: No chief complaint on file.    HPI:    ROS [employer] loop recorder interrogated with rep in office.  High ventricular rate due to artifact.  Low rates due to undersensing.  75% of battery remaining.    Past Medical History:   Diagnosis Date    Anxiety     Diabetes mellitus, type 2         Past Surgical History:   Procedure Laterality Date    broken arm  Right 1976    COLONOSCOPY  2016    ELBOW DEBRIDEMENT Left 2013    bone spur     KNEE CARTILAGE SURGERY Right 1997    NECK SURGERY  2016    c4 c5 c6 plate     SHOULDER ARTHROSCOPY Right 2015    reconstruction     THUMB ARTHROSCOPY Right 2015    reconstructive     TONSILLECTOMY  1969       Family History   Problem Relation Age of Onset    Cancer Mother     Diabetes Father     Cancer Brother     Heart disease Brother        Social History     Socioeconomic History    Marital status: Single   Tobacco Use    Smoking status: Former     Packs/day: 0.30     Types: Cigarettes    Smokeless tobacco: Never   Substance and Sexual Activity    Alcohol use: No     Comment: hx of     Drug use: No     Comment: hx of     Sexual activity: Not Currently     Partners: Female       Current Outpatient Medications on File Prior to Visit   Medication Sig Dispense Refill    ACCU-CHEK GUIDE TEST STRIPS Strp USE TO check blood sugar ONCE DAILY      amitriptyline (ELAVIL) 10 MG tablet Take 20 mg by mouth every evening.      amLODIPine (NORVASC) 2.5 MG tablet Take 2.5 mg by mouth once daily.      aspirin (ECOTRIN) 81 MG EC tablet Take 81 mg by mouth once daily.      atenolol (TENORMIN) 25 MG tablet TAKE 1 TABLET BY MOUTH EVERY DAY (Patient not taking: Reported on 1/18/2023) 30 tablet 5    atorvastatin (LIPITOR) 20 MG tablet Take 1 tablet (20 mg total) by mouth once daily. (Patient taking differently: Take 40 mg by mouth once daily.) 30 tablet 3    atorvastatin (LIPITOR) 80 MG tablet Take 80 mg by mouth.       betamethasone valerate 0.1% (VALISONE) 0.1 % Crea Apply topically 2 (two) times daily.      busPIRone (BUSPAR) 15 MG tablet Take 1 tablet (15 mg total) by mouth 2 (two) times daily. (Patient not taking: Reported on 1/18/2023) 60 tablet 3    butalbital-acetaminophen-caffeine -40 mg (FIORICET, ESGIC) -40 mg per tablet Take by mouth.      cefdinir (OMNICEF) 300 MG capsule Take 300 mg by mouth 2 (two) times daily.      cephALEXin (KEFLEX) 500 MG capsule Take 500 mg by mouth once daily at 6am.      clotrimazole (LOTRIMIN) 1 % cream Apply topically 2 (two) times daily.      cyanocobalamin 2000 MCG tablet Take 2,000 mcg by mouth 2 (two) times daily.      diclofenac (VOLTAREN) 75 MG EC tablet Take 75 mg by mouth 2 (two) times daily.      EMGALITY  mg/mL PnIj Inject into the skin every 30 days.      felodipine (PLENDIL) 2.5 MG Tb24 Take 1 tablet by mouth once daily.      fenofibrate (TRICOR) 145 MG tablet Take 1 tablet (145 mg total) by mouth once daily at 6am. (Patient not taking: Reported on 1/18/2023) 30 tablet 3    fish oil-omega-3 fatty acids 300-1,000 mg capsule Take 1 g by mouth 2 (two) times daily.      ibuprofen (ADVIL,MOTRIN) 800 MG tablet Take 1 tablet (800 mg total) by mouth every 6 (six) hours as needed for Pain. (Patient not taking: Reported on 2/2/2023) 30 tablet 2    metFORMIN (GLUCOPHAGE) 1000 MG tablet TAKE 1 TABLET BY MOUTH TWICE DAILY WITH MEALS 60 tablet 5    multivitamin capsule Take 1 capsule by mouth once daily.      NURTEC 75 mg odt PLACE 1 TABLET BY MOUTH ON THE TONGUE AND ALLOW TO DISSOLVE EVERY DAY      pioglitazone-glimepiride (DUETACT) 30-2 mg per tablet Take 1 tablet by mouth once daily.      rizatriptan (MAXALT) 10 MG tablet Take 10 mg by mouth.      tadalafiL (CIALIS) 20 MG Tab Take 1 tablet (20 mg total) by mouth daily as needed (ED). 12 tablet 11    tamsulosin (FLOMAX) 0.4 mg Cap Take 1 capsule (0.4 mg total) by mouth once daily. 30 capsule 11    tizanidine (ZANAFLEX) 4  MG tablet Take 4 mg by mouth every 6 (six) hours as needed.      topiramate (TOPAMAX) 100 MG tablet Take 100 mg by mouth 2 (two) times daily.      TRUE METRIX AIR GLUCOSE METER Misc USE TO CHECK BLOOD GLUCOSE AS DIRECTED      TRUEPLUS LANCETS 33 gauge Misc USE TO CHECK BLOOD SUGAR EVERY DAY AS NEEDED      valACYclovir (VALTREX) 1000 MG tablet PRN      valACYclovir (VALTREX) 500 MG tablet Take 500 mg by mouth once daily.       No current facility-administered medications on file prior to visit.       Review of patient's allergies indicates:   Allergen Reactions    Pcn [penicillins]      Objective:   There were no vitals filed for this visit.     Physical Exam     Assessment:     1. History of loop recorder      Plan:   Diagnoses and all orders for this visit:    History of loop recorder         No follow-ups on file.

## 2023-02-16 DIAGNOSIS — R35.0 BENIGN PROSTATIC HYPERPLASIA WITH URINARY FREQUENCY: Primary | ICD-10-CM

## 2023-02-16 DIAGNOSIS — N52.8 OTHER MALE ERECTILE DYSFUNCTION: ICD-10-CM

## 2023-02-16 DIAGNOSIS — N40.1 BENIGN PROSTATIC HYPERPLASIA WITH URINARY FREQUENCY: Primary | ICD-10-CM

## 2023-02-16 NOTE — PROGRESS NOTES
Subjective:      Reynaldo Chong is a 57 y.o. male who returns today regarding BPH.    Benign Prostatic Hypertrophy  Patient complains of lower urinary tract symptoms. He reports nocturia 3x per night, daytime frequency, intermittency, straining, and urgency. He denies weak stream. Patient states symptoms are of severe severity. Onset of symptoms was several weeks ago and was unknown in onset. His AUA Symptom Score is, 19/5 . He has no personal history and no family history of prostate cancer. He denies flank pain, gross hematuria, kidney stones, and recurrent UTI.    He reports having UroLift done by Dr. Reyes in [place] several years ago.  He also reports history of circumcision revision; will request records.  +DMII; +HSV; +migraines; possible sleep apnea- no previous workup     Also reports erectile dysfunction; present for many years.  No previous treatment.    The following portions of the patient's history were reviewed and updated as appropriate: allergies, current medications, past family history, past medical history, past social history, past surgical history and problem list.    Review of Systems  A comprehensive multipoint review of systems was negative except as otherwise stated in the HPI.     Objective:   Vitals: There were no vitals taken for this visit.      Physical Exam   General: alert and oriented, no acute distress  Head: normocephalic, atraumatic  Neck: supple, no lymphadenopathy, normal ROM, no masses  Respiratory: Symmetric expansion, non-labored breathing  Cardiovascular: regular rate and rhythm, nomal pulses, no peripheral edema  Skin: normal coloration and turgor, no rashes, no suspicious skin lesions noted  Neuro: alert and oriented x3, no gross deficits  Psych: normal judgment and insight, normal mood/affect, and non-anxious    Lab Review   Urinalysis demonstrates no specimen   Lab Results   Component Value Date    WBC 9.15 05/31/2017    HGB 14.0 05/31/2017    HCT 42.7 05/31/2017    MCV  90 05/31/2017     (H) 05/31/2017     Lab Results   Component Value Date    CREATININE 0.9 05/31/2017    BUN 11 05/31/2017         Assessment and Plan:   1. Benign prostatic hyperplasia with urinary frequency  ***    2. Other male erectile dysfunction  ***         This note is dictated on M*Modal word recognition program.  There are word recognition mistakes that are occasionally missed on review.

## 2023-03-06 ENCOUNTER — OFFICE VISIT (OUTPATIENT)
Dept: UROLOGY | Facility: CLINIC | Age: 58
End: 2023-03-06
Payer: MEDICAID

## 2023-03-06 ENCOUNTER — OFFICE VISIT (OUTPATIENT)
Dept: CARDIOLOGY | Facility: CLINIC | Age: 58
End: 2023-03-06
Payer: MEDICAID

## 2023-03-06 ENCOUNTER — TELEPHONE (OUTPATIENT)
Dept: CARDIOLOGY | Facility: CLINIC | Age: 58
End: 2023-03-06
Payer: MEDICAID

## 2023-03-06 VITALS
DIASTOLIC BLOOD PRESSURE: 83 MMHG | BODY MASS INDEX: 35.97 KG/M2 | HEART RATE: 74 BPM | HEIGHT: 71 IN | WEIGHT: 256.94 LBS | SYSTOLIC BLOOD PRESSURE: 130 MMHG

## 2023-03-06 VITALS
WEIGHT: 256 LBS | HEART RATE: 73 BPM | HEIGHT: 71 IN | BODY MASS INDEX: 35.84 KG/M2 | OXYGEN SATURATION: 97 % | DIASTOLIC BLOOD PRESSURE: 83 MMHG | SYSTOLIC BLOOD PRESSURE: 138 MMHG

## 2023-03-06 DIAGNOSIS — I10 PRIMARY HYPERTENSION: ICD-10-CM

## 2023-03-06 DIAGNOSIS — R35.0 BENIGN PROSTATIC HYPERPLASIA WITH URINARY FREQUENCY: Primary | ICD-10-CM

## 2023-03-06 DIAGNOSIS — Z98.890 HISTORY OF LOOP RECORDER: Primary | ICD-10-CM

## 2023-03-06 DIAGNOSIS — N52.8 OTHER MALE ERECTILE DYSFUNCTION: ICD-10-CM

## 2023-03-06 DIAGNOSIS — N40.1 BENIGN PROSTATIC HYPERPLASIA WITH URINARY FREQUENCY: Primary | ICD-10-CM

## 2023-03-06 PROCEDURE — 3075F SYST BP GE 130 - 139MM HG: CPT | Mod: CPTII,,, | Performed by: NURSE PRACTITIONER

## 2023-03-06 PROCEDURE — 3008F PR BODY MASS INDEX (BMI) DOCUMENTED: ICD-10-PCS | Mod: CPTII,,, | Performed by: NURSE PRACTITIONER

## 2023-03-06 PROCEDURE — 99999 PR PBB SHADOW E&M-EST. PATIENT-LVL V: CPT | Mod: PBBFAC,,, | Performed by: NURSE PRACTITIONER

## 2023-03-06 PROCEDURE — 99999 PR PBB SHADOW E&M-EST. PATIENT-LVL V: ICD-10-PCS | Mod: PBBFAC,,, | Performed by: NURSE PRACTITIONER

## 2023-03-06 PROCEDURE — 99215 OFFICE O/P EST HI 40 MIN: CPT | Mod: PBBFAC,27,PN | Performed by: NURSE PRACTITIONER

## 2023-03-06 PROCEDURE — 1160F RVW MEDS BY RX/DR IN RCRD: CPT | Mod: CPTII,,, | Performed by: NURSE PRACTITIONER

## 2023-03-06 PROCEDURE — 3079F PR MOST RECENT DIASTOLIC BLOOD PRESSURE 80-89 MM HG: ICD-10-PCS | Mod: CPTII,,, | Performed by: NURSE PRACTITIONER

## 2023-03-06 PROCEDURE — 1159F PR MEDICATION LIST DOCUMENTED IN MEDICAL RECORD: ICD-10-PCS | Mod: CPTII,,, | Performed by: NURSE PRACTITIONER

## 2023-03-06 PROCEDURE — 99213 PR OFFICE/OUTPT VISIT, EST, LEVL III, 20-29 MIN: ICD-10-PCS | Mod: S$PBB,,, | Performed by: NURSE PRACTITIONER

## 2023-03-06 PROCEDURE — 3079F DIAST BP 80-89 MM HG: CPT | Mod: CPTII,,, | Performed by: NURSE PRACTITIONER

## 2023-03-06 PROCEDURE — 3075F PR MOST RECENT SYSTOLIC BLOOD PRESS GE 130-139MM HG: ICD-10-PCS | Mod: CPTII,,, | Performed by: NURSE PRACTITIONER

## 2023-03-06 PROCEDURE — 99214 OFFICE O/P EST MOD 30 MIN: CPT | Mod: S$PBB,,, | Performed by: NURSE PRACTITIONER

## 2023-03-06 PROCEDURE — 1160F PR REVIEW ALL MEDS BY PRESCRIBER/CLIN PHARMACIST DOCUMENTED: ICD-10-PCS | Mod: CPTII,,, | Performed by: NURSE PRACTITIONER

## 2023-03-06 PROCEDURE — 99213 OFFICE O/P EST LOW 20 MIN: CPT | Mod: S$PBB,,, | Performed by: NURSE PRACTITIONER

## 2023-03-06 PROCEDURE — 3008F BODY MASS INDEX DOCD: CPT | Mod: CPTII,,, | Performed by: NURSE PRACTITIONER

## 2023-03-06 PROCEDURE — 1159F MED LIST DOCD IN RCRD: CPT | Mod: CPTII,,, | Performed by: NURSE PRACTITIONER

## 2023-03-06 PROCEDURE — 99214 PR OFFICE/OUTPT VISIT, EST, LEVL IV, 30-39 MIN: ICD-10-PCS | Mod: S$PBB,,, | Performed by: NURSE PRACTITIONER

## 2023-03-06 PROCEDURE — 99215 OFFICE O/P EST HI 40 MIN: CPT | Mod: PBBFAC,PN | Performed by: NURSE PRACTITIONER

## 2023-03-06 RX ORDER — FLUCONAZOLE 100 MG/1
100 TABLET ORAL
COMMUNITY
Start: 2023-02-02 | End: 2023-03-13

## 2023-03-06 RX ORDER — NEOMYCIN SULFATE, POLYMYXIN B SULFATE, HYDROCORTISONE 3.5; 10000; 1 MG/ML; [USP'U]/ML; MG/ML
4 SOLUTION/ DROPS AURICULAR (OTIC) 3 TIMES DAILY
COMMUNITY
Start: 2023-02-02 | End: 2023-03-27 | Stop reason: CLARIF

## 2023-03-06 NOTE — PROGRESS NOTES
"        Cardiology    3/13/2023  10:11 AM    Problem list  Patient Active Problem List   Diagnosis    Screening for prostate cancer    Rash    Encounter to establish care with new doctor    Hypercholesteremia    Controlled type 2 diabetes mellitus with diabetic polyneuropathy, without long-term current use of insulin    Chronic migraine without aura without status migrainosus, not intractable    History of narcotic addiction    Cocaine dependence in remission    Chronic pain due to trauma    History of loop recorder    Primary hypertension    Chronic venous insufficiency of lower extremity    Viral syndrome       CC:  Results review    HPI:  Has neurology appt pending- having significant problems getting in to see them due to lack of availability.  Had ILR interrogated and some abnormalities noted but could be due to artifact. Still having episodes of "losing time" that are unclear.  They do not appear to be correlating with the episodes described on the monitor. Has regurgitation when eating sometimes when he eats.        Medications  Current Outpatient Medications   Medication Sig Dispense Refill    ACCU-CHEK GUIDE TEST STRIPS Strp USE TO check blood sugar ONCE DAILY      amitriptyline (ELAVIL) 10 MG tablet Take 20 mg by mouth every evening.      amLODIPine (NORVASC) 2.5 MG tablet Take 2.5 mg by mouth once daily.      aspirin (ECOTRIN) 81 MG EC tablet Take 81 mg by mouth once daily.      atorvastatin (LIPITOR) 80 MG tablet Take 80 mg by mouth.      butalbital-acetaminophen-caffeine -40 mg (FIORICET, ESGIC) -40 mg per tablet Take by mouth.      EMGALITY  mg/mL PnIj Inject into the skin every 30 days.      fish oil-omega-3 fatty acids 300-1,000 mg capsule Take 1 g by mouth once daily.      metFORMIN (GLUCOPHAGE) 1000 MG tablet TAKE 1 TABLET BY MOUTH TWICE DAILY WITH MEALS 60 tablet 5    multivitamin capsule Take 1 capsule by mouth once daily.      neomycin-polymyxin-hydrocortisone (CORTISPORIN) " otic solution Place 4 drops into both ears 3 (three) times daily.      NURTEC 75 mg odt PLACE 1 TABLET BY MOUTH ON THE TONGUE AND ALLOW TO DISSOLVE EVERY DAY      pioglitazone-glimepiride (DUETACT) 30-2 mg per tablet Take 1 tablet by mouth once daily.      rizatriptan (MAXALT) 10 MG tablet Take 10 mg by mouth once as needed.      tadalafiL (CIALIS) 20 MG Tab Take 1 tablet (20 mg total) by mouth daily as needed (ED). 12 tablet 11    tamsulosin (FLOMAX) 0.4 mg Cap Take 1 capsule (0.4 mg total) by mouth once daily. 30 capsule 11    topiramate (TOPAMAX) 100 MG tablet Take 100 mg by mouth 2 (two) times daily.      valACYclovir (VALTREX) 1000 MG tablet PRN      valACYclovir (VALTREX) 500 MG tablet Take 500 mg by mouth once daily.      betamethasone valerate 0.1% (VALISONE) 0.1 % Crea Apply topically 2 (two) times daily.      busPIRone (BUSPAR) 15 MG tablet Take 1 tablet (15 mg total) by mouth 2 (two) times daily. (Patient not taking: Reported on 3/6/2023) 60 tablet 3    cefdinir (OMNICEF) 300 MG capsule Take 300 mg by mouth 2 (two) times daily.      cephALEXin (KEFLEX) 500 MG capsule Take 500 mg by mouth once daily at 6am.      clotrimazole (LOTRIMIN) 1 % cream Apply topically 2 (two) times daily.      diclofenac (VOLTAREN) 75 MG EC tablet Take 75 mg by mouth 2 (two) times daily.      felodipine (PLENDIL) 2.5 MG Tb24 Take 1 tablet by mouth once daily.      fluconazole (DIFLUCAN) 100 MG tablet Take 100 mg by mouth.      ibuprofen (ADVIL,MOTRIN) 800 MG tablet Take 1 tablet (800 mg total) by mouth every 6 (six) hours as needed for Pain. (Patient not taking: Reported on 2/2/2023) 30 tablet 2    promethazine-dextromethorphan (PROMETHAZINE-DM) 6.25-15 mg/5 mL Syrp Take 7.5 mLs by mouth every 6 (six) hours as needed (N/V/Cough). 118 mL 0    tizanidine (ZANAFLEX) 4 MG tablet Take 4 mg by mouth every 6 (six) hours as needed.       No current facility-administered medications for this visit.      Prior to Admission medications     Medication Sig Start Date End Date Taking? Authorizing Provider   ACCU-CHEK GUIDE TEST STRIPS Strp USE TO check blood sugar ONCE DAILY 1/30/23  Yes Historical Provider   amitriptyline (ELAVIL) 10 MG tablet Take 20 mg by mouth every evening. 1/20/23  Yes Historical Provider   amLODIPine (NORVASC) 2.5 MG tablet Take 2.5 mg by mouth once daily. 7/17/22  Yes Historical Provider   aspirin (ECOTRIN) 81 MG EC tablet Take 81 mg by mouth once daily.   Yes Historical Provider   atorvastatin (LIPITOR) 80 MG tablet Take 80 mg by mouth. 1/18/23  Yes Historical Provider   butalbital-acetaminophen-caffeine -40 mg (FIORICET, ESGIC) -40 mg per tablet Take by mouth. 1/27/23  Yes Historical Provider   EMGALITY  mg/mL PnIj Inject into the skin every 30 days. 1/18/23  Yes Historical Provider   fish oil-omega-3 fatty acids 300-1,000 mg capsule Take 1 g by mouth once daily.   Yes Historical Provider   metFORMIN (GLUCOPHAGE) 1000 MG tablet TAKE 1 TABLET BY MOUTH TWICE DAILY WITH MEALS 7/9/18  Yes Pepito Cruz MD   multivitamin capsule Take 1 capsule by mouth once daily.   Yes Historical Provider   neomycin-polymyxin-hydrocortisone (CORTISPORIN) otic solution Place 4 drops into both ears 3 (three) times daily. 2/2/23  Yes Historical Provider   NURTEC 75 mg odt PLACE 1 TABLET BY MOUTH ON THE TONGUE AND ALLOW TO DISSOLVE EVERY DAY 7/16/22  Yes Historical Provider   pioglitazone-glimepiride (DUETACT) 30-2 mg per tablet Take 1 tablet by mouth once daily. 6/22/22  Yes Historical Provider   rizatriptan (MAXALT) 10 MG tablet Take 10 mg by mouth once as needed. 1/2/23  Yes Historical Provider   tadalafiL (CIALIS) 20 MG Tab Take 1 tablet (20 mg total) by mouth daily as needed (ED). 1/18/23 3/6/23 Yes Sabra Leo NP   tamsulosin (FLOMAX) 0.4 mg Cap Take 1 capsule (0.4 mg total) by mouth once daily. 1/18/23 3/6/23 Yes Sabra Leo NP   topiramate (TOPAMAX) 100 MG tablet Take 100 mg by mouth 2 (two) times daily.  7/16/22  Yes Historical Provider   valACYclovir (VALTREX) 1000 MG tablet PRN   Yes Historical Provider   valACYclovir (VALTREX) 500 MG tablet Take 500 mg by mouth once daily.   Yes Historical Provider   betamethasone valerate 0.1% (VALISONE) 0.1 % Crea Apply topically 2 (two) times daily.    Historical Provider   busPIRone (BUSPAR) 15 MG tablet Take 1 tablet (15 mg total) by mouth 2 (two) times daily.  Patient not taking: Reported on 3/6/2023 5/29/17   Pepito Cruz MD   cefdinir (OMNICEF) 300 MG capsule Take 300 mg by mouth 2 (two) times daily. 1/31/23   Historical Provider   cephALEXin (KEFLEX) 500 MG capsule Take 500 mg by mouth once daily at 6am.    Historical Provider   clotrimazole (LOTRIMIN) 1 % cream Apply topically 2 (two) times daily.    Historical Provider   diclofenac (VOLTAREN) 75 MG EC tablet Take 75 mg by mouth 2 (two) times daily.    Historical Provider   felodipine (PLENDIL) 2.5 MG Tb24 Take 1 tablet by mouth once daily.    Historical Provider   fluconazole (DIFLUCAN) 100 MG tablet Take 100 mg by mouth. 2/2/23   Historical Provider   ibuprofen (ADVIL,MOTRIN) 800 MG tablet Take 1 tablet (800 mg total) by mouth every 6 (six) hours as needed for Pain.  Patient not taking: Reported on 2/2/2023 5/29/17   Pepito Cruz MD   promethazine-dextromethorphan (PROMETHAZINE-DM) 6.25-15 mg/5 mL Syrp Take 7.5 mLs by mouth every 6 (six) hours as needed (N/V/Cough). 3/11/23   Kristina Brunner, NP   tizanidine (ZANAFLEX) 4 MG tablet Take 4 mg by mouth every 6 (six) hours as needed.    Historical Provider         History  Past Medical History:   Diagnosis Date    Anxiety     Diabetes mellitus, type 2      Past Surgical History:   Procedure Laterality Date    broken arm  Right 1976    COLONOSCOPY  2016    ELBOW DEBRIDEMENT Left 2013    bone spur     KNEE CARTILAGE SURGERY Right 1997    NECK SURGERY  2016    c4 c5 c6 plate     SHOULDER ARTHROSCOPY Right 2015    reconstruction     THUMB ARTHROSCOPY Right 2015     reconstructive     TONSILLECTOMY  1969     Social History     Socioeconomic History    Marital status: Single   Tobacco Use    Smoking status: Former     Packs/day: 0.30     Types: Cigarettes    Smokeless tobacco: Never   Substance and Sexual Activity    Alcohol use: No     Comment: hx of     Drug use: No     Comment: hx of     Sexual activity: Not Currently     Partners: Female         Allergies  Review of patient's allergies indicates:   Allergen Reactions    Pcn [penicillins]          Review of Systems   Review of Systems   Constitutional: Negative for diaphoresis and malaise/fatigue.   HENT: Negative.     Cardiovascular:  Negative for chest pain, claudication, dyspnea on exertion, irregular heartbeat, leg swelling, near-syncope, orthopnea, palpitations, paroxysmal nocturnal dyspnea and syncope.   Respiratory:  Negative for shortness of breath.    Endocrine: Negative for polydipsia, polyphagia and polyuria.   Hematologic/Lymphatic: Does not bruise/bleed easily.   Gastrointestinal:  Negative for bloating, nausea and vomiting.   Genitourinary: Negative.    Neurological:  Negative for excessive daytime sleepiness, dizziness, light-headedness, loss of balance and weakness.   Psychiatric/Behavioral:  The patient is not nervous/anxious.    Allergic/Immunologic: Negative.        Physical Exam  Wt Readings from Last 1 Encounters:   03/11/23 117.5 kg (259 lb)     BP Readings from Last 3 Encounters:   03/11/23 (!) 176/82   03/06/23 138/83   03/06/23 130/83     Pulse Readings from Last 1 Encounters:   03/11/23 82     Body mass index is 35.7 kg/m².    Physical Exam  Vitals and nursing note reviewed.   Constitutional:       Appearance: Normal appearance.      Comments: BMI 36   HENT:      Head: Normocephalic and atraumatic.      Mouth/Throat:      Mouth: Mucous membranes are moist.   Eyes:      Pupils: Pupils are equal, round, and reactive to light.   Cardiovascular:      Rate and Rhythm: Normal rate and regular rhythm.       Pulses:           Radial pulses are 2+ on the right side and 2+ on the left side.        Dorsalis pedis pulses are 2+ on the right side and 2+ on the left side.        Posterior tibial pulses are 2+ on the right side and 2+ on the left side.      Heart sounds: No murmur heard.  Pulmonary:      Effort: Pulmonary effort is normal. No respiratory distress.      Breath sounds: Normal breath sounds.   Abdominal:      General: There is no distension.      Tenderness: There is no abdominal tenderness.   Musculoskeletal:      Cervical back: Normal range of motion.      Right lower leg: No edema.      Left lower leg: No edema.   Skin:     General: Skin is warm and dry.      Findings: No erythema.   Neurological:      General: No focal deficit present.      Mental Status: He is alert.   Psychiatric:         Mood and Affect: Mood normal.         Behavior: Behavior normal.       Problem List Items Addressed This Visit          Cardiac/Vascular    Primary hypertension    Overview     Continue current medications         Relevant Orders    CBC W/ AUTO DIFFERENTIAL (Completed)    COMPREHENSIVE METABOLIC PANEL (Completed)    TSH (Completed)       Other    History of loop recorder - Primary    Overview     Requesting removal  He is unable to describe any symptoms he may be having-  Interrogation suggested some abnormalities that were likely due to artifact  Refer to Dr. Becker for removal         Relevant Orders    Ambulatory referral/consult to Interventional Cardiology               Follow Up  PRN      @[email or web address] Singh DNP

## 2023-03-06 NOTE — PROGRESS NOTES
"  Subjective:      Reynaldo Chong is a 57 y.o. male who returns today regarding his BPH.    Recently seen for BPH and ED; started Flomax daily; presents today for follow up. Reports significant improvement in nocturia and frequency.   Reports little to no improvement with Cialis 20 mg q 72 hours; wishes to try daily dosing.       HPI: Benign Prostatic Hypertrophy  Patient complains of lower urinary tract symptoms. He reports nocturia 3x per night, daytime frequency, intermittency, straining, and urgency. He denies weak stream. Patient states symptoms are of severe severity. Onset of symptoms was several weeks ago and was unknown in onset. His AUA Symptom Score is, 19/5 . He has no personal history and no family history of prostate cancer. He denies flank pain, gross hematuria, kidney stones, and recurrent UTI.    He reports having UroLift done by Dr. Reyes in [place] several years ago.  He also reports history of circumcision revision; will request records.  +DMII; +HSV; +migraines; possible sleep apnea- no previous workup     Also reports erectile dysfunction; present for many years.  No previous treatment.    The following portions of the patient's history were reviewed and updated as appropriate: allergies, current medications, past family history, past medical history, past social history, past surgical history and problem list.    Review of Systems  A comprehensive multipoint review of systems was negative except as otherwise stated in the HPI.     Objective:   Vitals: /83 (BP Location: Left arm, Patient Position: Sitting, BP Method: Large (Automatic))   Pulse 74   Ht 5' 11" (1.803 m)   Wt 116.6 kg (256 lb 15.1 oz)   BMI 35.84 kg/m²     Physical Exam   General: alert and oriented, no acute distress  Respiratory: Symmetric expansion, non-labored breathing  Neuro: no gross deficits  Psych: normal judgment and insight, normal mood/affect, and non-anxious    Lab Review   Urinalysis demonstrates no " specimen   Lab Results   Component Value Date    WBC 9.15 05/31/2017    HGB 14.0 05/31/2017    HCT 42.7 05/31/2017    MCV 90 05/31/2017     (H) 05/31/2017     Lab Results   Component Value Date    CREATININE 0.9 05/31/2017    BUN 11 05/31/2017     Lab Results   Component Value Date    PSA 0.38 05/31/2017     Lab Results   Component Value Date    PSA 0.38 05/31/2017             Assessment and Plan:   1. Benign prostatic hyperplasia with urinary frequency  --Continue Flomax daily   - Prostate Specific Antigen, Diagnostic; Future    2. Other male erectile dysfunction  --Cialis 20 mg q 48 hours      --will notify with results  --fu 1 year       This note is dictated on M*Modal word recognition program.  There are word recognition mistakes that are occasionally missed on review.

## 2023-03-06 NOTE — TELEPHONE ENCOUNTER
Appt scheduled with  to schedule loop recorder removal.    ----- Message from Aleah Granados sent at 3/6/2023  9:48 AM CST -----  Name of Who is Calling:VIBHA SALES [[id number]]              What is the request in detail:Requesting a call back regarding an appt.               Can the clinic reply by [facility]:              [phone]

## 2023-03-06 NOTE — PROGRESS NOTES
"        Cardiology    3/6/2023  8:40 AM    Problem list  Patient Active Problem List   Diagnosis    Screening for prostate cancer    Rash    Encounter to establish care with new doctor    Hypercholesteremia    Controlled type 2 diabetes mellitus with diabetic polyneuropathy, without long-term current use of insulin    Chronic migraine without aura without status migrainosus, not intractable    History of narcotic addiction    Cocaine dependence in remission    Chronic pain due to trauma    History of loop recorder    Primary hypertension    Chronic venous insufficiency of lower extremity       CC:  Results review    HPI:  Has neurology appt pending- having significant problems getting in to see them due to lack of availability.  Had ILR interrogated and some abnormalities noted but could be due to artifact. Still having episodes of "losing time" that are unclear.  They do not appear to be correlating with the episodes described on the monitor. Has regurgitation when eating sometimes when he eats.     Medications  Current Outpatient Medications   Medication Sig Dispense Refill    ACCU-CHEK GUIDE TEST STRIPS Strp USE TO check blood sugar ONCE DAILY      amitriptyline (ELAVIL) 10 MG tablet Take 20 mg by mouth every evening.      amLODIPine (NORVASC) 2.5 MG tablet Take 2.5 mg by mouth once daily.      aspirin (ECOTRIN) 81 MG EC tablet Take 81 mg by mouth once daily.      atorvastatin (LIPITOR) 80 MG tablet Take 80 mg by mouth.      butalbital-acetaminophen-caffeine -40 mg (FIORICET, ESGIC) -40 mg per tablet Take by mouth.      EMGALITY  mg/mL PnIj Inject into the skin every 30 days.      fish oil-omega-3 fatty acids 300-1,000 mg capsule Take 1 g by mouth once daily.      metFORMIN (GLUCOPHAGE) 1000 MG tablet TAKE 1 TABLET BY MOUTH TWICE DAILY WITH MEALS 60 tablet 5    multivitamin capsule Take 1 capsule by mouth once daily.      neomycin-polymyxin-hydrocortisone (CORTISPORIN) otic solution Place 4 " drops into both ears 3 (three) times daily.      NURTEC 75 mg odt PLACE 1 TABLET BY MOUTH ON THE TONGUE AND ALLOW TO DISSOLVE EVERY DAY      pioglitazone-glimepiride (DUETACT) 30-2 mg per tablet Take 1 tablet by mouth once daily.      rizatriptan (MAXALT) 10 MG tablet Take 10 mg by mouth once as needed.      tadalafiL (CIALIS) 20 MG Tab Take 1 tablet (20 mg total) by mouth daily as needed (ED). 12 tablet 11    tamsulosin (FLOMAX) 0.4 mg Cap Take 1 capsule (0.4 mg total) by mouth once daily. 30 capsule 11    topiramate (TOPAMAX) 100 MG tablet Take 100 mg by mouth 2 (two) times daily.      valACYclovir (VALTREX) 1000 MG tablet PRN      valACYclovir (VALTREX) 500 MG tablet Take 500 mg by mouth once daily.      betamethasone valerate 0.1% (VALISONE) 0.1 % Crea Apply topically 2 (two) times daily.      busPIRone (BUSPAR) 15 MG tablet Take 1 tablet (15 mg total) by mouth 2 (two) times daily. (Patient not taking: Reported on 3/6/2023) 60 tablet 3    cefdinir (OMNICEF) 300 MG capsule Take 300 mg by mouth 2 (two) times daily.      cephALEXin (KEFLEX) 500 MG capsule Take 500 mg by mouth once daily at 6am.      clotrimazole (LOTRIMIN) 1 % cream Apply topically 2 (two) times daily.      diclofenac (VOLTAREN) 75 MG EC tablet Take 75 mg by mouth 2 (two) times daily.      felodipine (PLENDIL) 2.5 MG Tb24 Take 1 tablet by mouth once daily.      fluconazole (DIFLUCAN) 100 MG tablet Take 100 mg by mouth.      ibuprofen (ADVIL,MOTRIN) 800 MG tablet Take 1 tablet (800 mg total) by mouth every 6 (six) hours as needed for Pain. (Patient not taking: Reported on 2/2/2023) 30 tablet 2    tizanidine (ZANAFLEX) 4 MG tablet Take 4 mg by mouth every 6 (six) hours as needed.      TRUE METRIX AIR GLUCOSE METER Misc USE TO CHECK BLOOD GLUCOSE AS DIRECTED      TRUEPLUS LANCETS 33 gauge Misc USE TO CHECK BLOOD SUGAR EVERY DAY AS NEEDED       No current facility-administered medications for this visit.      Prior to Admission medications     Medication Sig Start Date End Date Taking? Authorizing Provider   ACCU-CHEK GUIDE TEST STRIPS Strp USE TO check blood sugar ONCE DAILY 1/30/23  Yes Historical Provider   amitriptyline (ELAVIL) 10 MG tablet Take 20 mg by mouth every evening. 1/20/23  Yes Historical Provider   amLODIPine (NORVASC) 2.5 MG tablet Take 2.5 mg by mouth once daily. 7/17/22  Yes Historical Provider   aspirin (ECOTRIN) 81 MG EC tablet Take 81 mg by mouth once daily.   Yes Historical Provider   atorvastatin (LIPITOR) 80 MG tablet Take 80 mg by mouth. 1/18/23  Yes Historical Provider   butalbital-acetaminophen-caffeine -40 mg (FIORICET, ESGIC) -40 mg per tablet Take by mouth. 1/27/23  Yes Historical Provider   EMGALITY  mg/mL PnIj Inject into the skin every 30 days. 1/18/23  Yes Historical Provider   fish oil-omega-3 fatty acids 300-1,000 mg capsule Take 1 g by mouth once daily.   Yes Historical Provider   metFORMIN (GLUCOPHAGE) 1000 MG tablet TAKE 1 TABLET BY MOUTH TWICE DAILY WITH MEALS 7/9/18  Yes Pepito Cruz MD   multivitamin capsule Take 1 capsule by mouth once daily.   Yes Historical Provider   neomycin-polymyxin-hydrocortisone (CORTISPORIN) otic solution Place 4 drops into both ears 3 (three) times daily. 2/2/23  Yes Historical Provider   NURTEC 75 mg odt PLACE 1 TABLET BY MOUTH ON THE TONGUE AND ALLOW TO DISSOLVE EVERY DAY 7/16/22  Yes Historical Provider   pioglitazone-glimepiride (DUETACT) 30-2 mg per tablet Take 1 tablet by mouth once daily. 6/22/22  Yes Historical Provider   rizatriptan (MAXALT) 10 MG tablet Take 10 mg by mouth once as needed. 1/2/23  Yes Historical Provider   tadalafiL (CIALIS) 20 MG Tab Take 1 tablet (20 mg total) by mouth daily as needed (ED). 1/18/23 3/6/23 Yes Sabra Leo NP   tamsulosin (FLOMAX) 0.4 mg Cap Take 1 capsule (0.4 mg total) by mouth once daily. 1/18/23 3/6/23 Yes Sabra Leo NP   topiramate (TOPAMAX) 100 MG tablet Take 100 mg by mouth 2 (two) times daily.  7/16/22  Yes Historical Provider   valACYclovir (VALTREX) 1000 MG tablet PRN   Yes Historical Provider   valACYclovir (VALTREX) 500 MG tablet Take 500 mg by mouth once daily.   Yes Historical Provider   betamethasone valerate 0.1% (VALISONE) 0.1 % Crea Apply topically 2 (two) times daily.    Historical Provider   busPIRone (BUSPAR) 15 MG tablet Take 1 tablet (15 mg total) by mouth 2 (two) times daily.  Patient not taking: Reported on 3/6/2023 5/29/17   Pepito Cruz MD   cefdinir (OMNICEF) 300 MG capsule Take 300 mg by mouth 2 (two) times daily. 1/31/23   Historical Provider   cephALEXin (KEFLEX) 500 MG capsule Take 500 mg by mouth once daily at 6am.    Historical Provider   clotrimazole (LOTRIMIN) 1 % cream Apply topically 2 (two) times daily.    Historical Provider   diclofenac (VOLTAREN) 75 MG EC tablet Take 75 mg by mouth 2 (two) times daily.    Historical Provider   felodipine (PLENDIL) 2.5 MG Tb24 Take 1 tablet by mouth once daily.    Historical Provider   fluconazole (DIFLUCAN) 100 MG tablet Take 100 mg by mouth. 2/2/23   Historical Provider   ibuprofen (ADVIL,MOTRIN) 800 MG tablet Take 1 tablet (800 mg total) by mouth every 6 (six) hours as needed for Pain.  Patient not taking: Reported on 2/2/2023 5/29/17   Pepito Cruz MD   tizanidine (ZANAFLEX) 4 MG tablet Take 4 mg by mouth every 6 (six) hours as needed.    Historical Provider   TRUE METRIX AIR GLUCOSE METER Misc USE TO CHECK BLOOD GLUCOSE AS DIRECTED 6/23/22   Historical Provider   TRUEPLUS LANCETS 33 gauge Misc USE TO CHECK BLOOD SUGAR EVERY DAY AS NEEDED 7/7/22   Historical Provider   atenolol (TENORMIN) 25 MG tablet TAKE 1 TABLET BY MOUTH EVERY DAY  Patient not taking: Reported on 3/6/2023 8/8/18 3/6/23  Pepito Cruz MD   atorvastatin (LIPITOR) 20 MG tablet Take 1 tablet (20 mg total) by mouth once daily.  Patient not taking: Reported on 3/6/2023 5/29/17 3/6/23  Pepito Cruz MD   cyanocobalamin 2000 MCG tablet Take 2,000 mcg by mouth 2  (two) times daily.  3/6/23  Historical Provider   fenofibrate (TRICOR) 145 MG tablet Take 1 tablet (145 mg total) by mouth once daily at 6am.  Patient not taking: Reported on 1/18/2023 5/29/17 3/6/23  Pepito Cruz MD         History  Past Medical History:   Diagnosis Date    Anxiety     Diabetes mellitus, type 2      Past Surgical History:   Procedure Laterality Date    broken arm  Right 1976    COLONOSCOPY  2016    ELBOW DEBRIDEMENT Left 2013    bone spur     KNEE CARTILAGE SURGERY Right 1997    NECK SURGERY  2016    c4 c5 c6 plate     SHOULDER ARTHROSCOPY Right 2015    reconstruction     THUMB ARTHROSCOPY Right 2015    reconstructive     TONSILLECTOMY  1969     Social History     Socioeconomic History    Marital status: Single   Tobacco Use    Smoking status: Former     Packs/day: 0.30     Types: Cigarettes    Smokeless tobacco: Never   Substance and Sexual Activity    Alcohol use: No     Comment: hx of     Drug use: No     Comment: hx of     Sexual activity: Not Currently     Partners: Female         Allergies  Review of patient's allergies indicates:   Allergen Reactions    Pcn [penicillins]          Review of Systems   Review of Systems   Constitutional: Negative for diaphoresis and malaise/fatigue.   HENT: Negative.     Cardiovascular:  Negative for chest pain, claudication, dyspnea on exertion, irregular heartbeat, leg swelling, near-syncope, orthopnea, palpitations, paroxysmal nocturnal dyspnea and syncope.   Respiratory:  Negative for shortness of breath.    Endocrine: Negative for polydipsia, polyphagia and polyuria.   Hematologic/Lymphatic: Does not bruise/bleed easily.   Gastrointestinal:  Negative for bloating, nausea and vomiting.   Genitourinary: Negative.    Neurological:  Negative for excessive daytime sleepiness, dizziness, light-headedness, loss of balance and weakness.   Psychiatric/Behavioral:  The patient is not nervous/anxious.    Allergic/Immunologic: Negative.        Physical Exam  Wt  Readings from Last 1 Encounters:   03/06/23 116.1 kg (256 lb)     BP Readings from Last 3 Encounters:   03/06/23 138/83   03/06/23 130/83   02/02/23 (!) 150/81     Pulse Readings from Last 1 Encounters:   03/06/23 73     Body mass index is 35.7 kg/m².    Physical Exam  Vitals and nursing note reviewed.   Constitutional:       Appearance: Normal appearance.      Comments: BMI 36   HENT:      Head: Normocephalic and atraumatic.      Mouth/Throat:      Mouth: Mucous membranes are moist.   Eyes:      Pupils: Pupils are equal, round, and reactive to light.   Cardiovascular:      Rate and Rhythm: Normal rate and regular rhythm.      Pulses:           Radial pulses are 2+ on the right side and 2+ on the left side.        Dorsalis pedis pulses are 2+ on the right side and 2+ on the left side.        Posterior tibial pulses are 2+ on the right side and 2+ on the left side.      Heart sounds: No murmur heard.  Pulmonary:      Effort: Pulmonary effort is normal. No respiratory distress.      Breath sounds: Normal breath sounds.   Abdominal:      General: There is no distension.      Tenderness: There is no abdominal tenderness.   Musculoskeletal:      Cervical back: Normal range of motion.      Right lower leg: No edema.      Left lower leg: No edema.   Skin:     General: Skin is warm and dry.      Findings: No erythema.   Neurological:      General: No focal deficit present.      Mental Status: He is alert.   Psychiatric:         Mood and Affect: Mood normal.         Behavior: Behavior normal.       Problem List Items Addressed This Visit          Cardiac/Vascular    Primary hypertension    Overview     Continue current medications         Relevant Orders    CBC W/ AUTO DIFFERENTIAL (Completed)    COMPREHENSIVE METABOLIC PANEL (Completed)    TSH (Completed)       Other    History of loop recorder - Primary    Overview     Requesting removal  He is unable to describe any symptoms he may be having-  Interrogation suggested some  abnormalities that were likely due to artifact  Refer to Dr. Becker for removal         Relevant Orders    Ambulatory referral/consult to Interventional Cardiology                 Follow Up  PRN, refer to Dr. Becker for ILR removal      @Katelyn Singh DNP

## 2023-03-08 ENCOUNTER — TELEPHONE (OUTPATIENT)
Dept: CARDIOLOGY | Facility: CLINIC | Age: 58
End: 2023-03-08
Payer: MEDICAID

## 2023-03-08 NOTE — TELEPHONE ENCOUNTER
----- Message from Katelyn Singh DNP sent at 3/8/2023  4:11 PM CST -----  Please contact the patient and let them know that their results were fine and do not require any change in treatment.

## 2023-03-11 PROBLEM — B34.9 VIRAL SYNDROME: Status: ACTIVE | Noted: 2023-03-11

## 2023-03-20 ENCOUNTER — OFFICE VISIT (OUTPATIENT)
Dept: CARDIOLOGY | Facility: CLINIC | Age: 58
End: 2023-03-20
Payer: MEDICAID

## 2023-03-20 VITALS
DIASTOLIC BLOOD PRESSURE: 82 MMHG | BODY MASS INDEX: 35.8 KG/M2 | HEART RATE: 77 BPM | OXYGEN SATURATION: 96 % | WEIGHT: 256.69 LBS | SYSTOLIC BLOOD PRESSURE: 128 MMHG

## 2023-03-20 DIAGNOSIS — Z95.818 STATUS POST PLACEMENT OF IMPLANTABLE LOOP RECORDER: ICD-10-CM

## 2023-03-20 PROCEDURE — 3008F PR BODY MASS INDEX (BMI) DOCUMENTED: ICD-10-PCS | Mod: CPTII,,, | Performed by: INTERNAL MEDICINE

## 2023-03-20 PROCEDURE — 3074F PR MOST RECENT SYSTOLIC BLOOD PRESSURE < 130 MM HG: ICD-10-PCS | Mod: CPTII,,, | Performed by: INTERNAL MEDICINE

## 2023-03-20 PROCEDURE — 3079F PR MOST RECENT DIASTOLIC BLOOD PRESSURE 80-89 MM HG: ICD-10-PCS | Mod: CPTII,,, | Performed by: INTERNAL MEDICINE

## 2023-03-20 PROCEDURE — 3008F BODY MASS INDEX DOCD: CPT | Mod: CPTII,,, | Performed by: INTERNAL MEDICINE

## 2023-03-20 PROCEDURE — 1159F PR MEDICATION LIST DOCUMENTED IN MEDICAL RECORD: ICD-10-PCS | Mod: CPTII,,, | Performed by: INTERNAL MEDICINE

## 2023-03-20 PROCEDURE — 99999 PR PBB SHADOW E&M-EST. PATIENT-LVL III: ICD-10-PCS | Mod: PBBFAC,,, | Performed by: INTERNAL MEDICINE

## 2023-03-20 PROCEDURE — 99213 OFFICE O/P EST LOW 20 MIN: CPT | Mod: PBBFAC | Performed by: INTERNAL MEDICINE

## 2023-03-20 PROCEDURE — 99999 PR PBB SHADOW E&M-EST. PATIENT-LVL III: CPT | Mod: PBBFAC,,, | Performed by: INTERNAL MEDICINE

## 2023-03-20 PROCEDURE — 3074F SYST BP LT 130 MM HG: CPT | Mod: CPTII,,, | Performed by: INTERNAL MEDICINE

## 2023-03-20 PROCEDURE — 99213 OFFICE O/P EST LOW 20 MIN: CPT | Mod: S$PBB,,, | Performed by: INTERNAL MEDICINE

## 2023-03-20 PROCEDURE — 1160F RVW MEDS BY RX/DR IN RCRD: CPT | Mod: CPTII,,, | Performed by: INTERNAL MEDICINE

## 2023-03-20 PROCEDURE — 1160F PR REVIEW ALL MEDS BY PRESCRIBER/CLIN PHARMACIST DOCUMENTED: ICD-10-PCS | Mod: CPTII,,, | Performed by: INTERNAL MEDICINE

## 2023-03-20 PROCEDURE — 3079F DIAST BP 80-89 MM HG: CPT | Mod: CPTII,,, | Performed by: INTERNAL MEDICINE

## 2023-03-20 PROCEDURE — 99213 PR OFFICE/OUTPT VISIT, EST, LEVL III, 20-29 MIN: ICD-10-PCS | Mod: S$PBB,,, | Performed by: INTERNAL MEDICINE

## 2023-03-20 PROCEDURE — 1159F MED LIST DOCD IN RCRD: CPT | Mod: CPTII,,, | Performed by: INTERNAL MEDICINE

## 2023-03-20 NOTE — PROGRESS NOTES
[facility]    Chief Complaint  Chief Complaint   Patient presents with    Pre-op Exam       HPI:    Patient is referred for removal of a loop recorder.  Was implanted in [place].  Indication uncertain.  Denies a history of stroke, TIA, and syncope.  No palpitations.  He understands that has 75% of battery life left.  But he wants it removed because he finds it bothersome.  Recent interrogation showed no arrhythmias.    Medications  Current Outpatient Medications   Medication Sig Dispense Refill    ACCU-CHEK GUIDE TEST STRIPS Strp USE TO check blood sugar ONCE DAILY      amitriptyline (ELAVIL) 10 MG tablet Take 20 mg by mouth every evening.      amLODIPine (NORVASC) 2.5 MG tablet Take 2.5 mg by mouth once daily.      aspirin (ECOTRIN) 81 MG EC tablet Take 81 mg by mouth once daily.      atorvastatin (LIPITOR) 80 MG tablet Take 80 mg by mouth.      butalbital-acetaminophen-caffeine -40 mg (FIORICET, ESGIC) -40 mg per tablet Take by mouth.      EMGALITY  mg/mL PnIj Inject into the skin every 30 days.      fish oil-omega-3 fatty acids 300-1,000 mg capsule Take 1 g by mouth once daily.      metFORMIN (GLUCOPHAGE) 1000 MG tablet TAKE 1 TABLET BY MOUTH TWICE DAILY WITH MEALS 60 tablet 5    multivitamin capsule Take 1 capsule by mouth once daily.      neomycin-polymyxin-hydrocortisone (CORTISPORIN) otic solution Place 4 drops into both ears 3 (three) times daily.      NURTEC 75 mg odt PLACE 1 TABLET BY MOUTH ON THE TONGUE AND ALLOW TO DISSOLVE EVERY DAY      pioglitazone-glimepiride (DUETACT) 30-2 mg per tablet Take 1 tablet by mouth once daily.      promethazine-dextromethorphan (PROMETHAZINE-DM) 6.25-15 mg/5 mL Syrp Take 7.5 mLs by mouth every 6 (six) hours as needed (N/V/Cough). 118 mL 0    rizatriptan (MAXALT) 10 MG tablet Take 10 mg by mouth once as needed.      tadalafiL (CIALIS) 20 MG Tab Take 1 tablet (20 mg total) by mouth daily as needed (ED). 12 tablet 11    tamsulosin (FLOMAX)  0.4 mg Cap Take 1 capsule (0.4 mg total) by mouth once daily. 30 capsule 11    tizanidine (ZANAFLEX) 4 MG tablet Take 4 mg by mouth every 6 (six) hours as needed.      topiramate (TOPAMAX) 100 MG tablet Take 100 mg by mouth 2 (two) times daily.      valACYclovir (VALTREX) 1000 MG tablet PRN      valACYclovir (VALTREX) 500 MG tablet Take 500 mg by mouth once daily.       No current facility-administered medications for this visit.        History  Past Medical History:   Diagnosis Date    Anxiety     Diabetes mellitus, type 2      Past Surgical History:   Procedure Laterality Date    broken arm  Right 1976    COLONOSCOPY  2016    ELBOW DEBRIDEMENT Left 2013    bone spur     KNEE CARTILAGE SURGERY Right 1997    NECK SURGERY  2016    c4 c5 c6 plate     SHOULDER ARTHROSCOPY Right 2015    reconstruction     THUMB ARTHROSCOPY Right 2015    reconstructive     TONSILLECTOMY  1969     Social History     Socioeconomic History    Marital status: Single   Tobacco Use    Smoking status: Former     Packs/day: 0.30     Types: Cigarettes    Smokeless tobacco: Never   Substance and Sexual Activity    Alcohol use: No     Comment: hx of     Drug use: No     Comment: hx of     Sexual activity: Not Currently     Partners: Female     Family History   Problem Relation Age of Onset    Cancer Mother     Diabetes Father     Cancer Brother     Heart disease Brother         Allergies  Review of patient's allergies indicates:   Allergen Reactions    Pcn [penicillins]        Review of Systems   Review of Systems   Constitutional: Negative for malaise/fatigue.   Cardiovascular:  Negative for chest pain, dyspnea on exertion, leg swelling, orthopnea and paroxysmal nocturnal dyspnea.   Respiratory:  Negative for cough, shortness of breath and wheezing.    Hematologic/Lymphatic: Negative for bleeding problem.   Gastrointestinal:  Negative for constipation, nausea and vomiting.   Neurological:  Negative for dizziness.     Physical Exam  Vitals:     03/20/23 1326   BP: 128/82   Pulse: 77     Wt Readings from Last 1 Encounters:   03/20/23 116.4 kg (256 lb 11.2 oz)     Physical Exam  Constitutional:       General: He is not in acute distress.  Neck:      Vascular: No hepatojugular reflux or JVD.   Cardiovascular:      Rate and Rhythm: Normal rate and regular rhythm.      Heart sounds: S1 normal and S2 normal. No murmur heard.    No S3 or S4 sounds.   Pulmonary:      Effort: Pulmonary effort is normal.      Breath sounds: Normal breath sounds and air entry.   Chest:       Abdominal:      General: Bowel sounds are normal.      Palpations: Abdomen is soft. There is no hepatomegaly.      Tenderness: There is no abdominal tenderness.   Musculoskeletal:      Right lower leg: No edema.      Left lower leg: No edema.   Skin:     Coloration: Skin is not pale.   Neurological:      Mental Status: He is alert.   Psychiatric:         Behavior: Behavior is cooperative.       Labs  Admission on 03/11/2023, Discharged on 03/11/2023   Component Date Value Ref Range Status    WBC 03/11/2023 12.09  3.90 - 12.70 K/uL Final    RBC 03/11/2023 4.53 (L)  4.60 - 6.20 M/uL Final    Hemoglobin 03/11/2023 14.1  14.0 - 18.0 g/dL Final    Hematocrit 03/11/2023 43.8  40.0 - 54.0 % Final    MCV 03/11/2023 97  82 - 98 fL Final    MCH 03/11/2023 31.1 (H)  27.0 - 31.0 pg Final    MCHC 03/11/2023 32.2  32.0 - 36.0 g/dL Final    RDW 03/11/2023 12.6  11.5 - 14.5 % Final    Platelets 03/11/2023 286  150 - 450 K/uL Final    MPV 03/11/2023 10.0  9.2 - 12.9 fL Final    Immature Granulocytes 03/11/2023 0.5  0.0 - 0.5 % Final    Gran # (ANC) 03/11/2023 8.5 (H)  1.8 - 7.7 K/uL Final    Immature Grans (Abs) 03/11/2023 0.06 (H)  0.00 - 0.04 K/uL Final    Comment: Mild elevation in immature granulocytes is non specific and   can be seen in a variety of conditions including stress response,   acute inflammation, trauma and pregnancy. Correlation with other   laboratory and clinical findings is essential.       Lymph # 03/11/2023 2.0  1.0 - 4.8 K/uL Final    Mono # 03/11/2023 1.1 (H)  0.3 - 1.0 K/uL Final    Eos # 03/11/2023 0.4  0.0 - 0.5 K/uL Final    Baso # 03/11/2023 0.05  0.00 - 0.20 K/uL Final    nRBC 03/11/2023 0  0 /100 WBC Final    Gran % 03/11/2023 70.4  38.0 - 73.0 % Final    Lymph % 03/11/2023 16.4 (L)  18.0 - 48.0 % Final    Mono % 03/11/2023 9.3  4.0 - 15.0 % Final    Eosinophil % 03/11/2023 3.0  0.0 - 8.0 % Final    Basophil % 03/11/2023 0.4  0.0 - 1.9 % Final    Differential Method 03/11/2023 Automated   Final    Sodium 03/11/2023 142  136 - 145 mmol/L Final    Potassium 03/11/2023 3.9  3.5 - 5.1 mmol/L Final    Chloride 03/11/2023 111 (H)  95 - 110 mmol/L Final    CO2 03/11/2023 20 (L)  23 - 29 mmol/L Final    Glucose 03/11/2023 139 (H)  70 - 110 mg/dL Final    BUN 03/11/2023 12  6 - 20 mg/dL Final    Creatinine 03/11/2023 0.9  0.5 - 1.4 mg/dL Final    Calcium 03/11/2023 9.5  8.7 - 10.5 mg/dL Final    Total Protein 03/11/2023 7.4  6.0 - 8.4 g/dL Final    Albumin 03/11/2023 4.2  3.5 - 5.2 g/dL Final    Total Bilirubin 03/11/2023 0.3  0.1 - 1.0 mg/dL Final    Comment: For infants and newborns, interpretation of results should be based  on gestational age, weight and in agreement with clinical  observations.    Premature Infant recommended reference ranges:  Up to 24 hours.............<8.0 mg/dL  Up to 48 hours............<12.0 mg/dL  3-5 days..................<15.0 mg/dL  6-29 days.................<15.0 mg/dL      Alkaline Phosphatase 03/11/2023 97  55 - 135 U/L Final    AST 03/11/2023 17  10 - 40 U/L Final    ALT 03/11/2023 20  10 - 44 U/L Final    Anion Gap 03/11/2023 11  8 - 16 mmol/L Final    eGFR 03/11/2023 >60.0  >60 mL/min/1.73 m^2 Final    POC Rapid COVID 03/11/2023 Negative  Negative Final     Acceptable 03/11/2023 Yes   Final    Troponin I 03/11/2023 <0.006  0.000 - 0.026 ng/mL Final    Comment: The reference interval for Troponin I represents the 99th percentile   cutoff   for our  facility and is consistent with 3rd generation assay   performance.      BNP 03/11/2023 <10  0 - 99 pg/mL Final    Values of less than 100 pg/ml are consistent with non-CHF populations.    POC Molecular Influenza A Ag 03/11/2023 Negative  Negative, Not Reported Final    POC Molecular Influenza B Ag 03/11/2023 Negative  Negative, Not Reported Final     Acceptable 03/11/2023 Yes   Final   Lab Visit on 03/08/2023   Component Date Value Ref Range Status    PSA Diagnostic 03/08/2023 0.48  0.00 - 4.00 ng/mL Final    Comment: The testing method is a chemiluminescent microparticle immunoassay   manufactured by [employer] and performed on the [employer] Machine   or   [employer] system. Values obtained with different assay manufacturers   for   methods may be different and cannot be used interchangeably.  PSA Expected levels:  Hormonal Therapy: <0.05 ng/ml  Prostatectomy: <0.01 ng/ml  Radiation Therapy: <1.00 ng/ml      WBC 03/08/2023 12.45  3.90 - 12.70 K/uL Final    RBC 03/08/2023 4.63  4.60 - 6.20 M/uL Final    Hemoglobin 03/08/2023 14.4  14.0 - 18.0 g/dL Final    Hematocrit 03/08/2023 44.1  40.0 - 54.0 % Final    MCV 03/08/2023 95  82 - 98 fL Final    MCH 03/08/2023 31.1 (H)  27.0 - 31.0 pg Final    MCHC 03/08/2023 32.7  32.0 - 36.0 g/dL Final    RDW 03/08/2023 12.6  11.5 - 14.5 % Final    Platelets 03/08/2023 287  150 - 450 K/uL Final    MPV 03/08/2023 9.9  9.2 - 12.9 fL Final    Immature Granulocytes 03/08/2023 0.3  0.0 - 0.5 % Final    Gran # (ANC) 03/08/2023 9.4 (H)  1.8 - 7.7 K/uL Final    Immature Grans (Abs) 03/08/2023 0.04  0.00 - 0.04 K/uL Final    Comment: Mild elevation in immature granulocytes is non specific and   can be seen in a variety of conditions including stress response,   acute inflammation, trauma and pregnancy. Correlation with other   laboratory and clinical findings is essential.      Lymph # 03/08/2023 1.6  1.0 - 4.8 K/uL Final    Mono # 03/08/2023 1.1 (H)  0.3 - 1.0 K/uL Final     Eos # 03/08/2023 0.3  0.0 - 0.5 K/uL Final    Baso # 03/08/2023 0.05  0.00 - 0.20 K/uL Final    nRBC 03/08/2023 0  0 /100 WBC Final    Gran % 03/08/2023 75.1 (H)  38.0 - 73.0 % Final    Lymph % 03/08/2023 13.1 (L)  18.0 - 48.0 % Final    Mono % 03/08/2023 8.5  4.0 - 15.0 % Final    Eosinophil % 03/08/2023 2.6  0.0 - 8.0 % Final    Basophil % 03/08/2023 0.4  0.0 - 1.9 % Final    Differential Method 03/08/2023 Automated   Final    Sodium 03/08/2023 141  136 - 145 mmol/L Final    Potassium 03/08/2023 4.0  3.5 - 5.1 mmol/L Final    Chloride 03/08/2023 109  95 - 110 mmol/L Final    CO2 03/08/2023 20 (L)  23 - 29 mmol/L Final    Glucose 03/08/2023 116 (H)  70 - 110 mg/dL Final    BUN 03/08/2023 17  6 - 20 mg/dL Final    Creatinine 03/08/2023 1.1  0.5 - 1.4 mg/dL Final    Calcium 03/08/2023 9.5  8.7 - 10.5 mg/dL Final    Total Protein 03/08/2023 7.2  6.0 - 8.4 g/dL Final    Albumin 03/08/2023 4.1  3.5 - 5.2 g/dL Final    Total Bilirubin 03/08/2023 0.4  0.1 - 1.0 mg/dL Final    Comment: For infants and newborns, interpretation of results should be based  on gestational age, weight and in agreement with clinical  observations.    Premature Infant recommended reference ranges:  Up to 24 hours.............<8.0 mg/dL  Up to 48 hours............<12.0 mg/dL  3-5 days..................<15.0 mg/dL  6-29 days.................<15.0 mg/dL      Alkaline Phosphatase 03/08/2023 85  55 - 135 U/L Final    AST 03/08/2023 19  10 - 40 U/L Final    ALT 03/08/2023 22  10 - 44 U/L Final    Anion Gap 03/08/2023 12  8 - 16 mmol/L Final    eGFR 03/08/2023 >60.0  >60 mL/min/1.73 m^2 Final    TSH 03/08/2023 3.228  0.400 - 4.000 uIU/mL Final   Office Visit on 01/18/2023   Component Date Value Ref Range Status    Color, UA 01/18/2023 Yellow   Final    pH, UA 01/18/2023 6.5   Final    WBC, UA 01/18/2023 negative   Final    Nitrite, UA 01/18/2023 negative   Final    Protein, POC 01/18/2023 negative   Final    Glucose, UA 01/18/2023 negative   Final     Ketones, UA 01/18/2023 negative   Final    Urobilinogen, UA 01/18/2023 normal   Final    Bilirubin, POC 01/18/2023 negative   Final    Blood, UA 01/18/2023 negative   Final    Clarity, UA 01/18/2023 Clear   Final    Spec Grav UA 01/18/2023 1.015   Final    POC Residual Urine Volume 01/18/2023 0  0 - 100 mL Final    Urine Culture, Routine 01/18/2023 No growth   Final    RBC, UA 01/18/2023 0  0 - 4 /hpf Final    WBC, UA 01/18/2023 2  0 - 5 /hpf Final    Microscopic Comment 01/18/2023 SEE COMMENT   Final    Comment: Other formed elements not mentioned in the report are not   present in the microscopic examination.          Imaging  X-Ray Chest 1 View    Result Date: 3/11/2023  EXAMINATION: XR CHEST 1 VIEW CLINICAL HISTORY: shortness of breath; TECHNIQUE: Single frontal view of the chest was performed. COMPARISON: None FINDINGS: The lungs are well expanded and clear. No focal opacities are seen. The pleural spaces are clear. The cardiac silhouette is unremarkable.  There is a loop recorder device. The visualized osseous structures are unremarkable.     No acute abnormality. Electronically signed by: Mian Cooper Date:    03/11/2023 Time:    18:00      Assessment  1. Status post placement of implantable loop recorder  Devices obsolete and patient wishes it removed  - Ambulatory referral/consult to Interventional Cardiology  - Ambulatory referral/consult to Interventional Cardiology      Plan and Discussion    The procedure including its risks, benefits and alternatives were discussed in detail.  All questions were answered.  After shared decision making, appropriate consents were signed.          Cristóbal Becker MD

## 2023-03-27 ENCOUNTER — TELEPHONE (OUTPATIENT)
Dept: CARDIOLOGY | Facility: CLINIC | Age: 58
End: 2023-03-27
Payer: MEDICAID

## 2023-03-27 NOTE — TELEPHONE ENCOUNTER
Patient complains of swelling in right knee and calf starting yesterday, patient currently has leg elevated with ice.  Stat ultrasound ordered by XENIA Singh.  Informed patient [facility] does not have an appointment available for ultrasound today offered [facility], patient unable to drive and walk long distance due to swelling.  Scheduled ultrasound @ [facility] on 3/28/23 @ 2:00 PM, patient accepted appointment.

## 2023-03-27 NOTE — TELEPHONE ENCOUNTER
----- Message from Marisela Garcia sent at 3/27/2023 10:23 AM CDT -----  Regarding: Schedule Appt  Pt calling to schedule an appointment on today, having issues with circulation in his right leg from knee down. Having trouble getting  around. Please advise. Requesting a call back.      [phone] (home)

## 2023-03-28 ENCOUNTER — TELEPHONE (OUTPATIENT)
Dept: CARDIOLOGY | Facility: CLINIC | Age: 58
End: 2023-03-28
Payer: MEDICAID

## 2023-03-28 NOTE — TELEPHONE ENCOUNTER
----- Message from Katelyn Singh DNP sent at 3/28/2023  4:01 PM CDT -----  Please contact the patient and let them know that their results were fine and do not require any change in treatment. No blood clots to explain edema. Contact PCP.

## 2023-03-28 NOTE — TELEPHONE ENCOUNTER
Informed patient that ultrasound results of lower extremity was fine, no change in treatment required.  No blood clots to explain edema.  Patient will follow up with a PCP for edema.

## 2023-04-05 ENCOUNTER — TELEPHONE (OUTPATIENT)
Dept: CARDIOLOGY | Facility: CLINIC | Age: 58
End: 2023-04-05
Payer: MEDICAID

## 2023-04-05 NOTE — TELEPHONE ENCOUNTER
----- Message from Saman Jacobs sent at 4/5/2023 11:27 AM CDT -----  Contact: pt  Pt requesting call back RE: would like to schedule follow up for next week, nothing available in epic until may pt states that too far out. Pt had a procedure this morning and Dr Becker advise him to follow  Please call      Confirmed contact below:  Contact Name:Reynaldo Chong  Phone Number: [phone]

## 2023-04-12 ENCOUNTER — OFFICE VISIT (OUTPATIENT)
Dept: CARDIOLOGY | Facility: CLINIC | Age: 58
End: 2023-04-12
Payer: MEDICAID

## 2023-04-12 VITALS
BODY MASS INDEX: 35.73 KG/M2 | HEIGHT: 71 IN | HEART RATE: 82 BPM | DIASTOLIC BLOOD PRESSURE: 80 MMHG | OXYGEN SATURATION: 95 % | SYSTOLIC BLOOD PRESSURE: 146 MMHG | WEIGHT: 255.19 LBS

## 2023-04-12 DIAGNOSIS — I10 PRIMARY HYPERTENSION: ICD-10-CM

## 2023-04-12 DIAGNOSIS — Z98.890 HISTORY OF LOOP RECORDER: ICD-10-CM

## 2023-04-12 DIAGNOSIS — I87.2 CHRONIC VENOUS INSUFFICIENCY OF LOWER EXTREMITY: ICD-10-CM

## 2023-04-12 DIAGNOSIS — E11.42 CONTROLLED TYPE 2 DIABETES MELLITUS WITH DIABETIC POLYNEUROPATHY, WITHOUT LONG-TERM CURRENT USE OF INSULIN: Primary | ICD-10-CM

## 2023-04-12 PROCEDURE — 99999 PR PBB SHADOW E&M-EST. PATIENT-LVL V: ICD-10-PCS | Mod: PBBFAC,,, | Performed by: NURSE PRACTITIONER

## 2023-04-12 PROCEDURE — 99999 PR PBB SHADOW E&M-EST. PATIENT-LVL V: CPT | Mod: PBBFAC,,, | Performed by: NURSE PRACTITIONER

## 2023-04-12 PROCEDURE — 3077F SYST BP >= 140 MM HG: CPT | Mod: CPTII,,, | Performed by: NURSE PRACTITIONER

## 2023-04-12 PROCEDURE — 3008F BODY MASS INDEX DOCD: CPT | Mod: CPTII,,, | Performed by: NURSE PRACTITIONER

## 2023-04-12 PROCEDURE — 3079F PR MOST RECENT DIASTOLIC BLOOD PRESSURE 80-89 MM HG: ICD-10-PCS | Mod: CPTII,,, | Performed by: NURSE PRACTITIONER

## 2023-04-12 PROCEDURE — 3077F PR MOST RECENT SYSTOLIC BLOOD PRESSURE >= 140 MM HG: ICD-10-PCS | Mod: CPTII,,, | Performed by: NURSE PRACTITIONER

## 2023-04-12 PROCEDURE — 3079F DIAST BP 80-89 MM HG: CPT | Mod: CPTII,,, | Performed by: NURSE PRACTITIONER

## 2023-04-12 PROCEDURE — 1160F PR REVIEW ALL MEDS BY PRESCRIBER/CLIN PHARMACIST DOCUMENTED: ICD-10-PCS | Mod: CPTII,,, | Performed by: NURSE PRACTITIONER

## 2023-04-12 PROCEDURE — 99212 PR OFFICE/OUTPT VISIT, EST, LEVL II, 10-19 MIN: ICD-10-PCS | Mod: S$PBB,,, | Performed by: NURSE PRACTITIONER

## 2023-04-12 PROCEDURE — 99212 OFFICE O/P EST SF 10 MIN: CPT | Mod: S$PBB,,, | Performed by: NURSE PRACTITIONER

## 2023-04-12 PROCEDURE — 1159F MED LIST DOCD IN RCRD: CPT | Mod: CPTII,,, | Performed by: NURSE PRACTITIONER

## 2023-04-12 PROCEDURE — 1160F RVW MEDS BY RX/DR IN RCRD: CPT | Mod: CPTII,,, | Performed by: NURSE PRACTITIONER

## 2023-04-12 PROCEDURE — 3008F PR BODY MASS INDEX (BMI) DOCUMENTED: ICD-10-PCS | Mod: CPTII,,, | Performed by: NURSE PRACTITIONER

## 2023-04-12 PROCEDURE — 1159F PR MEDICATION LIST DOCUMENTED IN MEDICAL RECORD: ICD-10-PCS | Mod: CPTII,,, | Performed by: NURSE PRACTITIONER

## 2023-04-12 PROCEDURE — 99215 OFFICE O/P EST HI 40 MIN: CPT | Mod: PBBFAC,PN | Performed by: NURSE PRACTITIONER

## 2023-04-12 RX ORDER — FLUTICASONE FUROATE, UMECLIDINIUM BROMIDE AND VILANTEROL TRIFENATATE 100; 62.5; 25 UG/1; UG/1; UG/1
POWDER RESPIRATORY (INHALATION)
COMMUNITY
Start: 2023-03-17 | End: 2023-06-14

## 2023-04-12 RX ORDER — VALACYCLOVIR HYDROCHLORIDE 500 MG/1
500 TABLET, FILM COATED ORAL
Status: ON HOLD | COMMUNITY
Start: 2023-04-06 | End: 2023-09-22 | Stop reason: CLARIF

## 2023-04-12 RX ORDER — ALBUTEROL SULFATE 90 UG/1
2 AEROSOL, METERED RESPIRATORY (INHALATION) EVERY 4 HOURS
COMMUNITY
Start: 2023-04-07 | End: 2023-09-28

## 2023-04-12 NOTE — PATIENT INSTRUCTIONS
I have referred you to Yadira Presley to help with your diabetes    Your incision looks great.     Get in touch with Dr. Becker's office to discuss the vascular procedure you all discussed at your last visit    Continue your current medications

## 2023-04-12 NOTE — PROGRESS NOTES
Cardiology    4/12/2023  2:14 PM    Problem list  Patient Active Problem List   Diagnosis    Screening for prostate cancer    Rash    Encounter to establish care with new doctor    Hypercholesteremia    Controlled type 2 diabetes mellitus with diabetic polyneuropathy, without long-term current use of insulin    Chronic migraine without aura without status migrainosus, not intractable    History of narcotic addiction    Cocaine dependence in remission    Chronic pain due to trauma    History of loop recorder    Primary hypertension    Chronic venous insufficiency of lower extremity    Viral syndrome       CC:  S/P ILR removal.     HPI:Loop recorder removed last Wednesday.  Incision healing well- no redness, swelling, drainage, fevers or chills. No chest pain or SoB. Using Trelegy and feels well. Would like a complete heart assessment this summer as he is diabetic- he has been started on Januvia    Medications  Current Outpatient Medications   Medication Sig Dispense Refill    ACCU-CHEK GUIDE TEST STRIPS Strp USE TO check blood sugar ONCE DAILY      albuterol (PROVENTIL/VENTOLIN HFA) 90 mcg/actuation inhaler Inhale 2 puffs into the lungs every 4 (four) hours.      amitriptyline (ELAVIL) 10 MG tablet Take 20 mg by mouth every evening. 10-30 mg at night      amLODIPine (NORVASC) 2.5 MG tablet Take 2.5 mg by mouth nightly.      aspirin (ECOTRIN) 81 MG EC tablet Take 81 mg by mouth once daily.      atorvastatin (LIPITOR) 80 MG tablet Take 80 mg by mouth every evening.      butalbital-acetaminophen-caffeine -40 mg (FIORICET, ESGIC) -40 mg per tablet Take 1 tablet by mouth every 6 (six) hours as needed.      EMGALITY  mg/mL PnIj Inject into the skin every 30 days. Between 25-28      fish oil-omega-3 fatty acids 300-1,000 mg capsule Take 1 g by mouth once daily.      metFORMIN (GLUCOPHAGE) 1000 MG tablet TAKE 1 TABLET BY MOUTH TWICE DAILY WITH MEALS 60 tablet 5    multivitamin capsule Take 1  capsule by mouth once daily.      NURTEC 75 mg odt PLACE 1 TABLET BY MOUTH ON THE TONGUE AND ALLOW TO DISSOLVE EVERY DAY      pioglitazone-glimepiride (DUETACT) 30-2 mg per tablet Take 1 tablet by mouth once daily.      rizatriptan (MAXALT) 10 MG tablet Take 10 mg by mouth once as needed.      tadalafiL (CIALIS) 20 MG Tab Take 1 tablet (20 mg total) by mouth daily as needed (ED). (Patient taking differently: Take 20 mg by mouth every other day.) 12 tablet 11    tamsulosin (FLOMAX) 0.4 mg Cap Take 1 capsule (0.4 mg total) by mouth once daily. 30 capsule 11    topiramate (TOPAMAX) 100 MG tablet Take 100 mg by mouth 2 (two) times daily.      TRELEGY ELLIPTA 100-62.5-25 mcg DsDv INHALE one PUFF into THE lungs ONCE DAILY      valACYclovir (VALTREX) 500 MG tablet Take 500 mg by mouth.       No current facility-administered medications for this visit.      Prior to Admission medications    Medication Sig Start Date End Date Taking? Authorizing Provider   ACCU-CHEK GUIDE TEST STRIPS Strp USE TO check blood sugar ONCE DAILY 1/30/23  Yes Historical Provider   albuterol (PROVENTIL/VENTOLIN HFA) 90 mcg/actuation inhaler Inhale 2 puffs into the lungs every 4 (four) hours. 4/7/23  Yes Historical Provider   amitriptyline (ELAVIL) 10 MG tablet Take 20 mg by mouth every evening. 10-30 mg at night 1/20/23  Yes Historical Provider   amLODIPine (NORVASC) 2.5 MG tablet Take 2.5 mg by mouth nightly. 7/17/22  Yes Historical Provider   aspirin (ECOTRIN) 81 MG EC tablet Take 81 mg by mouth once daily.   Yes Historical Provider   atorvastatin (LIPITOR) 80 MG tablet Take 80 mg by mouth every evening. 1/18/23  Yes Historical Provider   butalbital-acetaminophen-caffeine -40 mg (FIORICET, ESGIC) -40 mg per tablet Take 1 tablet by mouth every 6 (six) hours as needed. 1/27/23  Yes Historical Provider   EMGALITY  mg/mL PnIj Inject into the skin every 30 days. Between 25-28 1/18/23  Yes Historical Provider   fish oil-omega-3 fatty  acids 300-1,000 mg capsule Take 1 g by mouth once daily.   Yes Historical Provider   metFORMIN (GLUCOPHAGE) 1000 MG tablet TAKE 1 TABLET BY MOUTH TWICE DAILY WITH MEALS 7/9/18  Yes Pepito Cruz MD   multivitamin capsule Take 1 capsule by mouth once daily.   Yes Historical Provider   NURTEC 75 mg odt PLACE 1 TABLET BY MOUTH ON THE TONGUE AND ALLOW TO DISSOLVE EVERY DAY 7/16/22  Yes Historical Provider   pioglitazone-glimepiride (DUETACT) 30-2 mg per tablet Take 1 tablet by mouth once daily. 6/22/22  Yes Historical Provider   rizatriptan (MAXALT) 10 MG tablet Take 10 mg by mouth once as needed. 1/2/23  Yes Historical Provider   tadalafiL (CIALIS) 20 MG Tab Take 1 tablet (20 mg total) by mouth daily as needed (ED).  Patient taking differently: Take 20 mg by mouth every other day. 1/18/23 4/12/23 Yes Sabra Leo NP   tamsulosin (FLOMAX) 0.4 mg Cap Take 1 capsule (0.4 mg total) by mouth once daily. 1/18/23 4/12/23 Yes Sabra Leo NP   topiramate (TOPAMAX) 100 MG tablet Take 100 mg by mouth 2 (two) times daily. 7/16/22  Yes Historical Provider   TRELEGY ELLIPTA 100-62.5-25 mcg DsDv INHALE one PUFF into THE lungs ONCE DAILY 3/17/23  Yes Historical Provider   valACYclovir (VALTREX) 500 MG tablet Take 500 mg by mouth. 4/6/23  Yes Historical Provider         History  Past Medical History:   Diagnosis Date    Anxiety     Diabetes mellitus, type 2     Enlarged prostate     Hypertension     Migraines     Mixed hyperlipidemia      Past Surgical History:   Procedure Laterality Date    broken arm  Right 1976    COLONOSCOPY  2016    ELBOW DEBRIDEMENT Left 2013    bone spur     KNEE CARTILAGE SURGERY Right 1997    NECK SURGERY  2016    c4 c5 c6 plate     PROSTATE SURGERY      staples put in prostate    REMOVAL OF IMPLANTABLE LOOP RECORDER Left 4/5/2023    Procedure: REMOVAL, IMPLANTABLE LOOP RECORDER;  Surgeon: Cristóbal Becker MD;  Location: [facility] CATH LAB;  Service: Cardiology;  Laterality: Left;    SHOULDER  ARTHROSCOPY Right 2015    reconstruction     THUMB ARTHROSCOPY Right 2015    reconstructive     TONSILLECTOMY  1969     Social History     Socioeconomic History    Marital status: Single   Tobacco Use    Smoking status: Former     Packs/day: 0.30     Types: Cigarettes    Smokeless tobacco: Never   Substance and Sexual Activity    Alcohol use: No     Comment: hx of     Drug use: No     Comment: hx of     Sexual activity: Not Currently     Partners: Female         Allergies  Review of patient's allergies indicates:   Allergen Reactions    Pcn [penicillins]          Review of Systems   Review of Systems   Constitutional: Negative for diaphoresis and malaise/fatigue.   HENT: Negative.     Cardiovascular:  Negative for chest pain, claudication, dyspnea on exertion, irregular heartbeat, leg swelling, near-syncope, orthopnea, palpitations, paroxysmal nocturnal dyspnea and syncope.   Respiratory:  Negative for shortness of breath.    Endocrine: Negative for polydipsia, polyphagia and polyuria.   Hematologic/Lymphatic: Does not bruise/bleed easily.   Gastrointestinal:  Negative for bloating, nausea and vomiting.   Genitourinary: Negative.    Neurological:  Negative for excessive daytime sleepiness, dizziness, light-headedness, loss of balance and weakness.   Psychiatric/Behavioral:  The patient is not nervous/anxious.    Allergic/Immunologic: Negative.        Physical Exam  Wt Readings from Last 1 Encounters:   04/12/23 115.8 kg (255 lb 2.9 oz)     BP Readings from Last 3 Encounters:   04/12/23 (!) 146/80   04/05/23 139/86   03/20/23 128/82     Pulse Readings from Last 1 Encounters:   04/12/23 82     Body mass index is 35.59 kg/m².    Physical Exam  Constitutional:       General: He is not in acute distress.  Neck:      Vascular: No hepatojugular reflux or JVD.   Cardiovascular:      Rate and Rhythm: Normal rate and regular rhythm.      Heart sounds: S1 normal and S2 normal. No murmur heard.    No S3 or S4 sounds.    Pulmonary:      Effort: Pulmonary effort is normal.      Breath sounds: Normal breath sounds and air entry.   Chest:       Abdominal:      General: Bowel sounds are normal.      Palpations: Abdomen is soft. There is no hepatomegaly.      Tenderness: There is no abdominal tenderness.   Musculoskeletal:      Right lower leg: No edema.      Left lower leg: No edema.   Skin:     Coloration: Skin is not pale.      Comments: Well healing incision on left chest wall.  No redness, drainage    Neurological:      Mental Status: He is alert.   Psychiatric:         Behavior: Behavior is cooperative.                 Problem List Items Addressed This Visit          Cardiac/Vascular    Primary hypertension    Overview     Continue current medications  Low Na diet           Current Assessment & Plan     As above           Chronic venous insufficiency of lower extremity    Overview     Will be evaluated by Dr. Becker- may require intervention.             Current Assessment & Plan     As above              Endocrine    Controlled type 2 diabetes mellitus with diabetic polyneuropathy, without long-term current use of insulin - Primary    Overview     Requesting referral to DM specialist           Current Assessment & Plan     As above           Relevant Orders    Ambulatory referral/consult to Endocrinology       Other    History of loop recorder    Overview     Removed, incision with no s/s of infection or impaired healing           Current Assessment & Plan     As above                Follow Up  6 months      @Katelyn Singh DNP

## 2023-04-13 DIAGNOSIS — I73.9 PAD (PERIPHERAL ARTERY DISEASE): Primary | ICD-10-CM

## 2023-04-14 ENCOUNTER — TELEPHONE (OUTPATIENT)
Dept: CARDIOLOGY | Facility: CLINIC | Age: 58
End: 2023-04-14
Payer: MEDICAID

## 2023-04-14 NOTE — TELEPHONE ENCOUNTER
----- Message from Padmini Rodriguez RN sent at 4/14/2023  8:31 AM CDT -----  Please schedule him for test then f/u with . I can't since he's medicaid  ----- Message -----  From: Cristóbla Becker MD  Sent: 4/13/2023   3:59 PM CDT  To: Padmini Rodriguez RN    Arterial doppler ordered  ----- Message -----  From: Padmini Rodriguez RN  Sent: 4/13/2023   3:54 PM CDT  To: Cristóbal Becker MD      ----- Message -----  From: Gladis Lemus  Sent: 4/13/2023   3:23 PM CDT  To: [facility] Staff     Name of Who is Calling:     What is the request in detail:  patient request call back in reference to schedule appointment Please contact to further discuss and advise      Can the clinic reply by [facility]:     What Number to Call Back if not in [facility]:  [phone]

## 2023-05-05 ENCOUNTER — HOSPITAL ENCOUNTER (OUTPATIENT)
Dept: CARDIOLOGY | Facility: OTHER | Age: 58
Discharge: HOME | End: 2023-05-05
Attending: INTERNAL MEDICINE
Payer: MEDICAID

## 2023-05-05 DIAGNOSIS — I73.9 PAD (PERIPHERAL ARTERY DISEASE): ICD-10-CM

## 2023-05-05 LAB
LEFT CFA PSV: 99 CM/S
LEFT POPLITEAL PSV: 62 CM/S
LEFT POST TIBIAL SYS PSV: 72 CM/S
LEFT PROFUNDA SYS PSV: 97 CM/S
LEFT SUPER FEMORAL DIST SYS PSV: 84 CM/S
LEFT SUPER FEMORAL MID SYS PSV: 78 CM/S
LEFT SUPER FEMORAL PROX SYS PSV: 88 CM/S
LEFT TIB/PER TRUNK SYS PSV: 77 CM/S
OHS CV LEFT LOWER EXTREMITY ABI (NO CALC): 1.05
OHS CV RIGHT ABI LOWER EXTREMITY (NO CALC): 1.02
RIGHT CFA PSV: 107 CM/S
RIGHT POPLITEAL PSV: 81 CM/S
RIGHT POST TIBIAL SYS PSV: 84 CM/S
RIGHT PROFUNDA SYS PSV: 65 CM/S
RIGHT SUPER FEMORAL DIST SYS PSV: 95 CM/S
RIGHT SUPER FEMORAL MID SYS PSV: 93 CM/S
RIGHT SUPER FEMORAL PROX SYS PSV: 113 CM/S
RIGHT TIB/PER TRUNK SYS PSV: 103 CM/S

## 2023-05-05 PROCEDURE — 93925 LOWER EXTREMITY STUDY: CPT | Mod: 26,,, | Performed by: INTERNAL MEDICINE

## 2023-05-05 PROCEDURE — 93925 CV US DOPPLER ARTERIAL LEGS BILATERAL (CUPID ONLY): ICD-10-PCS | Mod: 26,,, | Performed by: INTERNAL MEDICINE

## 2023-05-05 PROCEDURE — 93925 LOWER EXTREMITY STUDY: CPT

## 2023-06-05 ENCOUNTER — OFFICE VISIT (OUTPATIENT)
Dept: UROLOGY | Facility: CLINIC | Age: 58
End: 2023-06-05
Payer: MEDICAID

## 2023-06-05 VITALS — HEIGHT: 71 IN | WEIGHT: 250 LBS | BODY MASS INDEX: 35 KG/M2 | RESPIRATION RATE: 18 BRPM

## 2023-06-05 DIAGNOSIS — N52.8 OTHER MALE ERECTILE DYSFUNCTION: Primary | ICD-10-CM

## 2023-06-05 PROCEDURE — 3008F BODY MASS INDEX DOCD: CPT | Mod: CPTII,,, | Performed by: NURSE PRACTITIONER

## 2023-06-05 PROCEDURE — 99214 PR OFFICE/OUTPT VISIT, EST, LEVL IV, 30-39 MIN: ICD-10-PCS | Mod: S$PBB,,, | Performed by: NURSE PRACTITIONER

## 2023-06-05 PROCEDURE — 1160F RVW MEDS BY RX/DR IN RCRD: CPT | Mod: CPTII,,, | Performed by: NURSE PRACTITIONER

## 2023-06-05 PROCEDURE — 1160F PR REVIEW ALL MEDS BY PRESCRIBER/CLIN PHARMACIST DOCUMENTED: ICD-10-PCS | Mod: CPTII,,, | Performed by: NURSE PRACTITIONER

## 2023-06-05 PROCEDURE — 99214 OFFICE O/P EST MOD 30 MIN: CPT | Mod: S$PBB,,, | Performed by: NURSE PRACTITIONER

## 2023-06-05 PROCEDURE — 1159F MED LIST DOCD IN RCRD: CPT | Mod: CPTII,,, | Performed by: NURSE PRACTITIONER

## 2023-06-05 PROCEDURE — 99214 OFFICE O/P EST MOD 30 MIN: CPT | Mod: PBBFAC,PN | Performed by: NURSE PRACTITIONER

## 2023-06-05 PROCEDURE — 99999 PR PBB SHADOW E&M-EST. PATIENT-LVL IV: CPT | Mod: PBBFAC,,, | Performed by: NURSE PRACTITIONER

## 2023-06-05 PROCEDURE — 3008F PR BODY MASS INDEX (BMI) DOCUMENTED: ICD-10-PCS | Mod: CPTII,,, | Performed by: NURSE PRACTITIONER

## 2023-06-05 PROCEDURE — 99999 PR PBB SHADOW E&M-EST. PATIENT-LVL IV: ICD-10-PCS | Mod: PBBFAC,,, | Performed by: NURSE PRACTITIONER

## 2023-06-05 PROCEDURE — 1159F PR MEDICATION LIST DOCUMENTED IN MEDICAL RECORD: ICD-10-PCS | Mod: CPTII,,, | Performed by: NURSE PRACTITIONER

## 2023-06-05 RX ORDER — UBROGEPANT 50 MG/1
50 TABLET ORAL
COMMUNITY
Start: 2023-05-24

## 2023-06-05 RX ORDER — SILDENAFIL 100 MG/1
100 TABLET, FILM COATED ORAL DAILY PRN
Qty: 20 TABLET | Refills: 11 | Status: SHIPPED | OUTPATIENT
Start: 2023-06-05

## 2023-06-05 RX ORDER — IBUPROFEN 800 MG/1
800 TABLET ORAL 3 TIMES DAILY
COMMUNITY
Start: 2023-05-31

## 2023-06-05 NOTE — PROGRESS NOTES
"Subjective:      Reynaldo Chong is a 58 y.o. male who returns today regarding his ED.    Currently taking Cialis with minimal results. Denies SE. Interested in Viagra.  S/p Urolift about 3 years ago.     The following portions of the patient's history were reviewed and updated as appropriate: allergies, current medications, past family history, past medical history, past social history, past surgical history and problem list.    Review of Systems  A comprehensive multipoint review of systems was negative except as otherwise stated in the HPI.     Objective:   Vitals: Resp 18   Ht 5' 11" (1.803 m)   Wt 113.4 kg (250 lb)   BMI 34.87 kg/m²     Physical Exam   General: alert and oriented, no acute distress  Respiratory: Symmetric expansion, non-labored breathing  Cardiovascular: regular rate and rhythm, no peripheral edema  Abdomen: soft, non distended  Skin: normal coloration and turgor, no rashes, no suspicious skin lesions noted  Neuro: no gross deficits  Psych: normal judgment and insight, normal mood/affect, and non-anxious    Lab Review   Urinalysis demonstrates no specimen   Lab Results   Component Value Date    WBC 12.09 03/11/2023    HGB 14.1 03/11/2023    HCT 43.8 03/11/2023    MCV 97 03/11/2023     03/11/2023     Lab Results   Component Value Date    CREATININE 0.9 03/11/2023    BUN 12 03/11/2023     Lab Results   Component Value Date    PSA 0.38 05/31/2017    PSADIAG 0.48 03/08/2023       Assessment and Plan:   1. Other male erectile dysfunction  - sildenafiL (VIAGRA) 100 MG tablet; Take 1 tablet (100 mg total) by mouth daily as needed for Erectile Dysfunction.  Dispense: 20 tablet; Refill: 11  --discussed trimix- will proceed with Viagra not beneficial       This note is dictated on M*Modal word recognition program.  There are word recognition mistakes that are occasionally missed on review.    "

## 2023-06-13 ENCOUNTER — TELEPHONE (OUTPATIENT)
Dept: DIABETES | Facility: CLINIC | Age: 58
End: 2023-06-13
Payer: MEDICAID

## 2023-06-13 RX ORDER — EMPAGLIFLOZIN 10 MG/1
10 TABLET, FILM COATED ORAL
COMMUNITY
Start: 2023-06-05 | End: 2023-06-14

## 2023-06-13 NOTE — PROGRESS NOTES
CC:   Chief Complaint   Patient presents with    Diabetes Mellitus       HPI: Reynaldo Chong is a 58 y.o. male presents for an initial visit today for the management of T2DM     He was diagnosed with Type 2 diabetes in 2004 at the 24 hour clinic at [facility] - BG was 411. He was started on Metformin     Family hx of diabetes: younger brother, father , grandfather   Hospitalized for diabetes: denies   Insulin therapy: never     No personal or FH of thyroid cancer or personal of pancreatic cancer or pancreatitis.     He believes his last A1c is 6.7% or 6.8%     He was on Jardiance. No SE but reports his BG readings increased??      DIABETES COMPLICATIONS: peripheral neuropathy and peripheral vascular disease      Diabetes Management Status    ASA:  Yes - 81 mg daily     Statin: Taking-- Lipitor 80 mg nightly + fish oil   ACE/ARB: Not taking    The ASCVD Risk score (Josiah ARAGON, et al., 2019) failed to calculate for the following reasons:    Cannot find a previous HDL lab    Cannot find a previous total cholesterol lab      Screening or Prevention Patient's value Goal Complete/Controlled?   HgA1C Testing and Control   Lab Results   Component Value Date    HGBA1C 7.2 (H) 05/31/2017      Annually/Less than 8% No   Lipid profile Most Recent Lipid Panel Health Maintenance Topic Completion: Not Found Annually No   LDL control Lab Results   Component Value Date    LDLCALC 109.6 05/31/2017    Annually/Less than 100 mg/dl  No   Nephropathy screening Lab Results   Component Value Date    LABMICR 9.0 05/31/2017     Lab Results   Component Value Date    PROTEINUA Negative 05/31/2017    Annually No   Blood pressure BP Readings from Last 1 Encounters:   06/14/23 128/80    Less than 140/90 No   Dilated retinal exam Most Recent Eye Exam Date: Not Found Annually Yes   Foot exam   : 06/14/2023 Annually No       CURRENT A1C:    Hemoglobin A1C   Date Value Ref Range Status   05/31/2017 7.2 (H) 4.5 - 6.2 % Final     Comment:     According  "to ADA guidelines, hemoglobin A1C <7.0% represents  optimal control in non-pregnant diabetic patients.  Different  metrics may apply to specific populations.   Standards of Medical Care in Diabetes - 2016.  For the purpose of screening for the presence of diabetes:  <5.7%     Consistent with the absence of diabetes  5.7-6.4%  Consistent with increasing risk for diabetes   (prediabetes)  >or=6.5%  Consistent with diabetes  Currently no consensus exists for use of hemoglobin A1C  for diagnosis of diabetes for children.         GOAL A1C: 6.5-7% without hypoglycemia     DM MEDICATIONS USED IN THE PAST: Metformin    Jardiance -- caused his BG readings to "go up"   Pioglitazone  Glimepiride    Januvia?      CURRENT DIABETES MEDICATIONS: Metformin 1000 mg BID   Pioglitazone- Glimepiride 30-2 mg daily   He stopped Jardiance   Insulin: N/A   Missed doses: he stopped the Jardiance-- only took it for 5 days         BLOOD GLUCOSE MONITORING:  he checks his BG daily in the AM   Presents with logs for review                             HYPOGLYCEMIA:  No        MEALS: eating 3 meals per day   He likes salads with SF dressing   He does cheat   Snack: likes chocolate   Drinks: regular cokes- 1/2 20 oz        CURRENT EXERCISE:  No      Review of Systems  Review of Systems   Constitutional:  Negative for appetite change, fatigue and unexpected weight change.   HENT:  Negative for trouble swallowing.    Eyes:  Negative for visual disturbance.   Respiratory:  Negative for shortness of breath.    Cardiovascular:  Negative for chest pain.   Gastrointestinal:  Negative for nausea.   Endocrine: Negative for polydipsia, polyphagia and polyuria.   Genitourinary:         No Nocturia    Skin:  Negative for wound.   Neurological:  Positive for numbness and headaches.     Physical Exam   Physical Exam  Vitals and nursing note reviewed.   Constitutional:       Appearance: He is well-developed. He is obese.   HENT:      Head: Normocephalic and " atraumatic.      Right Ear: External ear normal.      Left Ear: External ear normal.      Nose: Nose normal.   Neck:      Thyroid: No thyromegaly.      Trachea: No tracheal deviation.   Cardiovascular:      Rate and Rhythm: Normal rate and regular rhythm.      Heart sounds: No murmur heard.  Pulmonary:      Effort: Pulmonary effort is normal. No respiratory distress.      Breath sounds: Normal breath sounds.   Abdominal:      Palpations: Abdomen is soft.      Tenderness: There is no abdominal tenderness.      Hernia: No hernia is present.   Musculoskeletal:      Cervical back: Normal range of motion and neck supple.   Skin:     General: Skin is warm and dry.      Capillary Refill: Capillary refill takes less than 2 seconds.      Findings: No rash.   Neurological:      Mental Status: He is alert and oriented to person, place, and time.      Cranial Nerves: No cranial nerve deficit.   Psychiatric:         Behavior: Behavior normal.         Judgment: Judgment normal.       FOOT EXAMINATION: Appropriate footwear   Protective Sensation (w/ 10 gram monofilament):  Right: Intact  Left: Intact    Visual Inspection:  Normal -  Bilateral and Nails Intact - without Evidence of Foot Deformity- Bilateral    Pedal Pulses:   Right: Present  Left: Present    Posterior Tibialis Pulses:   Right:Present  Left: Present          Lab Results   Component Value Date    TSH 3.228 03/08/2023           Type 2 diabetes mellitus with hyperglycemia, without long-term current use of insulin  Reportedly lasrt A1c was well controlled   Will request labs   Will get updated A1c level   Discussed DM course, progression, and the need for good BG control to prevent or allay long-term complications. Reviewed proper hypoglycemia management. Discussed consistency in BG monitoring to allow for safe titration of medications.       -- Medication Changes:   Continue Metformin 1000 mg 2 times per day     STOP PioGlitazone - Glimepiride combination pill      Restart Jardiance at 10 mg daily x 4 weeks -- if kidney function is good-- we are going to increase to 25 mg after 1 month on the 10 mg dose   -- Please let us know if you have nausea, vomiting, or weakness with a normal BG. This could be euglycemic DKA.  -- please hold medication 3 days prior to surgery or if you are sick and have decreased intake.   -- Please drink lots of water daily while on this medication.   --This medication could also give you a yeast infection- please let us know if this occurs and we can treat it.   -- no KETO DIET       Start Ozempic   Start Ozempic 0.25 mg weekly for 2-4 weeks & then 0.5 mg weekly thereafter.  Please notify me for any abdominal pain, nausea, vomiting, diarrhea, constipation, acid reflux       -- Reviewed goals of therapy are to get the best control we can without hypoglycemia.  -- Advised frequent self blood glucose monitoring.  Patient encouraged to document glucose results and bring them to every clinic visit. 1-2 times per day at alternating times   -- Hypoglycemia precautions discussed. Instructed on precautions before driving.    -- Call for Bg repeatedly < 70 or > 200.   -- Close adherence to lifestyle changes recommended.   -- Periodic follow ups for eye evaluations, foot care and dental care suggested.          Type 2 diabetes mellitus with diabetic polyneuropathy, without long-term current use of insulin  Optimize BG readings.   See above.     Educated patient to check feet daily for any foreign objects and/or wounds. Discussed with patient the importance of wearing appropriate footwear at all times, not to walk barefoot ever, and to check shoes before putting them on feet. Instructed patient to keep feet dry by regularly changing shoes and socks and drying feet after baths and exercises. Also, instructed patient to report any new lesions, discolorations, or swelling to a healthcare professional.        Primary hypertension  BP goal is < 140/90.   Controlled    Blood pressure goals discussed with patient      Dyslipidemia, goal LDL below 100  On statin per ADA recommendations  LDL goal < 100.  check lipids       Chronic venous insufficiency of lower extremity  Optimize BG readings.   See above.       Class 1 obesity due to excess calories with serious comorbidity and body mass index (BMI) of 33.0 to 33.9 in adult  Body mass index is 33.93 kg/m².  Increases insulin resistance.   Discussed DM diet and exercise.       I spent a total of 60 minutes on the day of the visit.This includes face to face time and non-face to face time preparing to see the patient (eg, review of tests), obtaining and/or reviewing separately obtained history, documenting clinical information in the electronic or other health record, independently interpreting results and communicating results to the patient/family/caregiver, or care coordinator.      Follow up in about 3 months (around 9/14/2023).  Request lab work from downstairs please -- [facility]   Schedule fasting labs -- Monday AM -- see if we can increase Jardiance to 25 mg daily   Follow up with me in 3 months   Schedule labs prior     Orders Placed This Encounter   Procedures    Hemoglobin A1C     Standing Status:   Future     Standing Expiration Date:   12/14/2024    Comprehensive Metabolic Panel     Standing Status:   Future     Standing Expiration Date:   12/14/2024    Microalbumin/Creatinine Ratio, Urine     Standing Status:   Future     Standing Expiration Date:   12/14/2024     Order Specific Question:   Specimen Source     Answer:   Urine    Lipid Panel     Standing Status:   Future     Standing Expiration Date:   8/12/2024    TSH     Standing Status:   Future     Standing Expiration Date:   12/14/2024       Recommendations were discussed with the patient in detail  The patient verbalized understanding and agrees with the plan outlined as above.     This note was partly generated with [employer] voice recognition software. I  apologize for any possible typographical errors.

## 2023-06-14 ENCOUNTER — OFFICE VISIT (OUTPATIENT)
Dept: DIABETES | Facility: CLINIC | Age: 58
End: 2023-06-14
Payer: MEDICAID

## 2023-06-14 VITALS
HEIGHT: 71 IN | SYSTOLIC BLOOD PRESSURE: 128 MMHG | DIASTOLIC BLOOD PRESSURE: 80 MMHG | WEIGHT: 243.31 LBS | HEART RATE: 68 BPM | BODY MASS INDEX: 34.06 KG/M2 | OXYGEN SATURATION: 96 %

## 2023-06-14 DIAGNOSIS — Z71.9 HEALTH EDUCATION/COUNSELING: ICD-10-CM

## 2023-06-14 DIAGNOSIS — E11.42 CONTROLLED TYPE 2 DIABETES MELLITUS WITH DIABETIC POLYNEUROPATHY, WITHOUT LONG-TERM CURRENT USE OF INSULIN: ICD-10-CM

## 2023-06-14 DIAGNOSIS — E78.5 DYSLIPIDEMIA, GOAL LDL BELOW 100: ICD-10-CM

## 2023-06-14 DIAGNOSIS — I87.2 CHRONIC VENOUS INSUFFICIENCY OF LOWER EXTREMITY: ICD-10-CM

## 2023-06-14 DIAGNOSIS — E11.65 TYPE 2 DIABETES MELLITUS WITH HYPERGLYCEMIA, WITHOUT LONG-TERM CURRENT USE OF INSULIN: Primary | ICD-10-CM

## 2023-06-14 DIAGNOSIS — I10 PRIMARY HYPERTENSION: ICD-10-CM

## 2023-06-14 DIAGNOSIS — E11.42 TYPE 2 DIABETES MELLITUS WITH DIABETIC POLYNEUROPATHY, WITHOUT LONG-TERM CURRENT USE OF INSULIN: ICD-10-CM

## 2023-06-14 DIAGNOSIS — E66.09 CLASS 1 OBESITY DUE TO EXCESS CALORIES WITH SERIOUS COMORBIDITY AND BODY MASS INDEX (BMI) OF 33.0 TO 33.9 IN ADULT: ICD-10-CM

## 2023-06-14 PROBLEM — E66.811 CLASS 1 OBESITY DUE TO EXCESS CALORIES WITH SERIOUS COMORBIDITY AND BODY MASS INDEX (BMI) OF 33.0 TO 33.9 IN ADULT: Status: ACTIVE | Noted: 2023-06-14

## 2023-06-14 PROCEDURE — 1159F MED LIST DOCD IN RCRD: CPT | Mod: CPTII,,, | Performed by: NURSE PRACTITIONER

## 2023-06-14 PROCEDURE — 3079F PR MOST RECENT DIASTOLIC BLOOD PRESSURE 80-89 MM HG: ICD-10-PCS | Mod: CPTII,,, | Performed by: NURSE PRACTITIONER

## 2023-06-14 PROCEDURE — 1159F PR MEDICATION LIST DOCUMENTED IN MEDICAL RECORD: ICD-10-PCS | Mod: CPTII,,, | Performed by: NURSE PRACTITIONER

## 2023-06-14 PROCEDURE — 99999 PR PBB SHADOW E&M-EST. PATIENT-LVL V: ICD-10-PCS | Mod: PBBFAC,,, | Performed by: NURSE PRACTITIONER

## 2023-06-14 PROCEDURE — 99215 OFFICE O/P EST HI 40 MIN: CPT | Mod: S$PBB,,, | Performed by: NURSE PRACTITIONER

## 2023-06-14 PROCEDURE — 99999 PR PBB SHADOW E&M-EST. PATIENT-LVL V: CPT | Mod: PBBFAC,,, | Performed by: NURSE PRACTITIONER

## 2023-06-14 PROCEDURE — 99215 PR OFFICE/OUTPT VISIT, EST, LEVL V, 40-54 MIN: ICD-10-PCS | Mod: S$PBB,,, | Performed by: NURSE PRACTITIONER

## 2023-06-14 PROCEDURE — 1160F PR REVIEW ALL MEDS BY PRESCRIBER/CLIN PHARMACIST DOCUMENTED: ICD-10-PCS | Mod: CPTII,,, | Performed by: NURSE PRACTITIONER

## 2023-06-14 PROCEDURE — 3074F PR MOST RECENT SYSTOLIC BLOOD PRESSURE < 130 MM HG: ICD-10-PCS | Mod: CPTII,,, | Performed by: NURSE PRACTITIONER

## 2023-06-14 PROCEDURE — 3074F SYST BP LT 130 MM HG: CPT | Mod: CPTII,,, | Performed by: NURSE PRACTITIONER

## 2023-06-14 PROCEDURE — 3079F DIAST BP 80-89 MM HG: CPT | Mod: CPTII,,, | Performed by: NURSE PRACTITIONER

## 2023-06-14 PROCEDURE — 99215 OFFICE O/P EST HI 40 MIN: CPT | Mod: PBBFAC,PN | Performed by: NURSE PRACTITIONER

## 2023-06-14 PROCEDURE — 3008F PR BODY MASS INDEX (BMI) DOCUMENTED: ICD-10-PCS | Mod: CPTII,,, | Performed by: NURSE PRACTITIONER

## 2023-06-14 PROCEDURE — 1160F RVW MEDS BY RX/DR IN RCRD: CPT | Mod: CPTII,,, | Performed by: NURSE PRACTITIONER

## 2023-06-14 PROCEDURE — 3008F BODY MASS INDEX DOCD: CPT | Mod: CPTII,,, | Performed by: NURSE PRACTITIONER

## 2023-06-14 RX ORDER — LANCETS
EACH MISCELLANEOUS
Qty: 200 EACH | Refills: 3 | Status: SHIPPED | OUTPATIENT
Start: 2023-06-14 | End: 2023-06-23 | Stop reason: SDUPTHER

## 2023-06-14 RX ORDER — SEMAGLUTIDE 0.68 MG/ML
0.5 INJECTION, SOLUTION SUBCUTANEOUS
Qty: 1 EACH | Refills: 6 | Status: SHIPPED | OUTPATIENT
Start: 2023-06-14 | End: 2023-09-14

## 2023-06-14 RX ORDER — EMPAGLIFLOZIN 10 MG/1
10 TABLET, FILM COATED ORAL DAILY
Start: 2023-06-14 | End: 2023-07-27

## 2023-06-14 NOTE — ASSESSMENT & PLAN NOTE
Reportedly lasrt A1c was well controlled   Will request labs   Will get updated A1c level   Discussed DM course, progression, and the need for good BG control to prevent or allay long-term complications. Reviewed proper hypoglycemia management. Discussed consistency in BG monitoring to allow for safe titration of medications.       -- Medication Changes:   Continue Metformin 1000 mg 2 times per day     STOP PioGlitazone - Glimepiride combination pill     Restart Jardiance at 10 mg daily x 4 weeks -- if kidney function is good-- we are going to increase to 25 mg after 1 month on the 10 mg dose   -- Please let us know if you have nausea, vomiting, or weakness with a normal BG. This could be euglycemic DKA.  -- please hold medication 3 days prior to surgery or if you are sick and have decreased intake.   -- Please drink lots of water daily while on this medication.   --This medication could also give you a yeast infection- please let us know if this occurs and we can treat it.   -- no KETO DIET       Start Ozempic   Start Ozempic 0.25 mg weekly for 2-4 weeks & then 0.5 mg weekly thereafter.  Please notify me for any abdominal pain, nausea, vomiting, diarrhea, constipation, acid reflux       -- Reviewed goals of therapy are to get the best control we can without hypoglycemia.  -- Advised frequent self blood glucose monitoring.  Patient encouraged to document glucose results and bring them to every clinic visit. 1-2 times per day at alternating times   -- Hypoglycemia precautions discussed. Instructed on precautions before driving.    -- Call for Bg repeatedly < 70 or > 200.   -- Close adherence to lifestyle changes recommended.   -- Periodic follow ups for eye evaluations, foot care and dental care suggested.

## 2023-06-14 NOTE — ASSESSMENT & PLAN NOTE
Body mass index is 33.93 kg/m².  Increases insulin resistance.   Discussed DM diet and exercise.

## 2023-06-14 NOTE — PATIENT INSTRUCTIONS
Continue Metformin 1000 mg 2 times per day     STOP PioGlitazone - Glimepiride combination pill     Restart Jardiance at 10 mg daily x 4 weeks -- if kidney function is good-- we are going to increase to 25 mg after 1 month on the 10 mg dose   -- Please let us know if you have nausea, vomiting, or weakness with a normal BG. This could be euglycemic DKA.  -- please hold medication 3 days prior to surgery or if you are sick and have decreased intake.   -- Please drink lots of water daily while on this medication.   --This medication could also give you a yeast infection- please let us know if this occurs and we can treat it.   -- no KETO DIET       Start Ozempic   Start Ozempic 0.25 mg weekly for 2-4 weeks & then 0.5 mg weekly thereafter.  Please notify me for any abdominal pain, nausea, vomiting, diarrhea, constipation, acid reflux

## 2023-06-20 ENCOUNTER — TELEPHONE (OUTPATIENT)
Dept: DIABETES | Facility: CLINIC | Age: 58
End: 2023-06-20
Payer: MEDICAID

## 2023-06-20 DIAGNOSIS — E11.65 TYPE 2 DIABETES MELLITUS WITH HYPERGLYCEMIA, WITHOUT LONG-TERM CURRENT USE OF INSULIN: Primary | ICD-10-CM

## 2023-06-22 ENCOUNTER — TELEPHONE (OUTPATIENT)
Dept: DIABETES | Facility: CLINIC | Age: 58
End: 2023-06-22
Payer: MEDICAID

## 2023-06-22 RX ORDER — BLOOD SUGAR DIAGNOSTIC
1 STRIP MISCELLANEOUS 3 TIMES DAILY
Qty: 100 STRIP | Refills: 11 | Status: SHIPPED | OUTPATIENT
Start: 2023-06-22 | End: 2023-10-17 | Stop reason: SDUPTHER

## 2023-06-22 NOTE — TELEPHONE ENCOUNTER
----- Message from Yadira Presley NP sent at 6/22/2023  3:15 PM CDT -----  Contact: [facility] [phone]    ----- Message -----  From: Chela Norman  Sent: 6/22/2023   3:01 PM CDT  To: Yadira Presley NP    Pharmacy is calling to clarify an RX.  RX name:  lancets Misc  What do they need to clarify:  PA needed  Comments:     Please call and advise.    Thank You

## 2023-06-23 DIAGNOSIS — E11.65 TYPE 2 DIABETES MELLITUS WITH HYPERGLYCEMIA, WITHOUT LONG-TERM CURRENT USE OF INSULIN: ICD-10-CM

## 2023-06-23 RX ORDER — LANCETS
EACH MISCELLANEOUS
Qty: 100 EACH | Refills: 3 | Status: SHIPPED | OUTPATIENT
Start: 2023-06-23 | End: 2024-01-17

## 2023-07-27 ENCOUNTER — TELEPHONE (OUTPATIENT)
Dept: DIABETES | Facility: CLINIC | Age: 58
End: 2023-07-27
Payer: MEDICAID

## 2023-07-27 DIAGNOSIS — E11.65 TYPE 2 DIABETES MELLITUS WITH HYPERGLYCEMIA, WITHOUT LONG-TERM CURRENT USE OF INSULIN: Primary | ICD-10-CM

## 2023-07-27 NOTE — TELEPHONE ENCOUNTER
----- Message from Maria De Jesus Navarro LPN sent at 7/27/2023  2:03 PM CDT -----  Pt stated he was increase to 25mg of jardiance , asking can you send in

## 2023-08-24 ENCOUNTER — TELEPHONE (OUTPATIENT)
Dept: DIABETES | Facility: CLINIC | Age: 58
End: 2023-08-24
Payer: MEDICAID

## 2023-08-24 NOTE — TELEPHONE ENCOUNTER
Pt called stating that he has injected ozempic for 3 months and just recently when injecting ozempic he vomited blood, advised pt to go to the er to be evaluated as vomiting blood is not a side effect of ozempic, pt also stated that the emgality pen was causing him to vomit blood also, pt was advised to proceed to er again to be evaluated, pt stated he was going to the er today, that he may go to er tomorrow

## 2023-08-24 NOTE — TELEPHONE ENCOUNTER
Pt called stating that he has injected ozempic for 3 months and just recently when injecting ozempic he vomited blood, advised pt to go to the er to be evaluated as vomiting blood is not a side effect of ozempic, pt also stated that the emgality pen was causing him to vomit blood also, pt was advised to proceed to er again to be evaluated

## 2023-09-14 ENCOUNTER — OFFICE VISIT (OUTPATIENT)
Dept: DIABETES | Facility: CLINIC | Age: 58
End: 2023-09-14
Payer: MEDICAID

## 2023-09-14 VITALS
DIASTOLIC BLOOD PRESSURE: 71 MMHG | HEIGHT: 71 IN | BODY MASS INDEX: 30.8 KG/M2 | WEIGHT: 220 LBS | SYSTOLIC BLOOD PRESSURE: 117 MMHG | OXYGEN SATURATION: 97 % | HEART RATE: 71 BPM

## 2023-09-14 DIAGNOSIS — E66.09 CLASS 1 OBESITY DUE TO EXCESS CALORIES WITH SERIOUS COMORBIDITY AND BODY MASS INDEX (BMI) OF 30.0 TO 30.9 IN ADULT: ICD-10-CM

## 2023-09-14 DIAGNOSIS — I10 PRIMARY HYPERTENSION: ICD-10-CM

## 2023-09-14 DIAGNOSIS — E11.42 TYPE 2 DIABETES MELLITUS WITH DIABETIC POLYNEUROPATHY, WITHOUT LONG-TERM CURRENT USE OF INSULIN: ICD-10-CM

## 2023-09-14 DIAGNOSIS — Z71.9 HEALTH EDUCATION/COUNSELING: ICD-10-CM

## 2023-09-14 DIAGNOSIS — E78.5 DYSLIPIDEMIA, GOAL LDL BELOW 100: ICD-10-CM

## 2023-09-14 DIAGNOSIS — E11.65 TYPE 2 DIABETES MELLITUS WITH HYPERGLYCEMIA, WITHOUT LONG-TERM CURRENT USE OF INSULIN: Primary | ICD-10-CM

## 2023-09-14 DIAGNOSIS — I87.2 CHRONIC VENOUS INSUFFICIENCY OF LOWER EXTREMITY: ICD-10-CM

## 2023-09-14 PROCEDURE — 99999 PR PBB SHADOW E&M-EST. PATIENT-LVL V: ICD-10-PCS | Mod: PBBFAC,,, | Performed by: NURSE PRACTITIONER

## 2023-09-14 PROCEDURE — 1159F PR MEDICATION LIST DOCUMENTED IN MEDICAL RECORD: ICD-10-PCS | Mod: CPTII,,, | Performed by: NURSE PRACTITIONER

## 2023-09-14 PROCEDURE — 1159F MED LIST DOCD IN RCRD: CPT | Mod: CPTII,,, | Performed by: NURSE PRACTITIONER

## 2023-09-14 PROCEDURE — 3078F DIAST BP <80 MM HG: CPT | Mod: CPTII,,, | Performed by: NURSE PRACTITIONER

## 2023-09-14 PROCEDURE — 3074F PR MOST RECENT SYSTOLIC BLOOD PRESSURE < 130 MM HG: ICD-10-PCS | Mod: CPTII,,, | Performed by: NURSE PRACTITIONER

## 2023-09-14 PROCEDURE — 3008F BODY MASS INDEX DOCD: CPT | Mod: CPTII,,, | Performed by: NURSE PRACTITIONER

## 2023-09-14 PROCEDURE — 99214 OFFICE O/P EST MOD 30 MIN: CPT | Mod: S$PBB,,, | Performed by: NURSE PRACTITIONER

## 2023-09-14 PROCEDURE — 99214 PR OFFICE/OUTPT VISIT, EST, LEVL IV, 30-39 MIN: ICD-10-PCS | Mod: S$PBB,,, | Performed by: NURSE PRACTITIONER

## 2023-09-14 PROCEDURE — 3008F PR BODY MASS INDEX (BMI) DOCUMENTED: ICD-10-PCS | Mod: CPTII,,, | Performed by: NURSE PRACTITIONER

## 2023-09-14 PROCEDURE — 3066F PR DOCUMENTATION OF TREATMENT FOR NEPHROPATHY: ICD-10-PCS | Mod: CPTII,,, | Performed by: NURSE PRACTITIONER

## 2023-09-14 PROCEDURE — 3044F HG A1C LEVEL LT 7.0%: CPT | Mod: CPTII,,, | Performed by: NURSE PRACTITIONER

## 2023-09-14 PROCEDURE — 3074F SYST BP LT 130 MM HG: CPT | Mod: CPTII,,, | Performed by: NURSE PRACTITIONER

## 2023-09-14 PROCEDURE — 99999 PR PBB SHADOW E&M-EST. PATIENT-LVL V: CPT | Mod: PBBFAC,,, | Performed by: NURSE PRACTITIONER

## 2023-09-14 PROCEDURE — 1160F PR REVIEW ALL MEDS BY PRESCRIBER/CLIN PHARMACIST DOCUMENTED: ICD-10-PCS | Mod: CPTII,,, | Performed by: NURSE PRACTITIONER

## 2023-09-14 PROCEDURE — 3061F PR NEG MICROALBUMINURIA RESULT DOCUMENTED/REVIEW: ICD-10-PCS | Mod: CPTII,,, | Performed by: NURSE PRACTITIONER

## 2023-09-14 PROCEDURE — 3066F NEPHROPATHY DOC TX: CPT | Mod: CPTII,,, | Performed by: NURSE PRACTITIONER

## 2023-09-14 PROCEDURE — 3078F PR MOST RECENT DIASTOLIC BLOOD PRESSURE < 80 MM HG: ICD-10-PCS | Mod: CPTII,,, | Performed by: NURSE PRACTITIONER

## 2023-09-14 PROCEDURE — 1160F RVW MEDS BY RX/DR IN RCRD: CPT | Mod: CPTII,,, | Performed by: NURSE PRACTITIONER

## 2023-09-14 PROCEDURE — 99215 OFFICE O/P EST HI 40 MIN: CPT | Mod: PBBFAC,PN | Performed by: NURSE PRACTITIONER

## 2023-09-14 PROCEDURE — 3061F NEG MICROALBUMINURIA REV: CPT | Mod: CPTII,,, | Performed by: NURSE PRACTITIONER

## 2023-09-14 PROCEDURE — 3044F PR MOST RECENT HEMOGLOBIN A1C LEVEL <7.0%: ICD-10-PCS | Mod: CPTII,,, | Performed by: NURSE PRACTITIONER

## 2023-09-14 RX ORDER — TIRZEPATIDE 2.5 MG/.5ML
2.5 INJECTION, SOLUTION SUBCUTANEOUS
Qty: 4 PEN | Refills: 0 | Status: SHIPPED | OUTPATIENT
Start: 2023-09-14 | End: 2023-11-27 | Stop reason: DRUGHIGH

## 2023-09-14 NOTE — PROGRESS NOTES
CC:   Chief Complaint   Patient presents with    Diabetes Mellitus       HPI: Reynaldo Chong is a 58 y.o. male presents for a follow up visit today for the management of T2DM     He was diagnosed with Type 2 diabetes in 2004 at the 24 hour clinic at [facility] - BG was 411. He was started on Metformin     Family hx of diabetes: younger brother, father , grandfather   Hospitalized for diabetes: denies   Insulin therapy: never     No personal or FH of thyroid cancer or personal of pancreatic cancer or pancreatitis.       Our last visit was in June 2023   At that visit we continued the metformin   We stopped the Pioglitazone - glimepiride combination pill   Restarted the Jardiance   Started ozempic weekly   Recent A1c is 6.5%   Weight from 243# to 220#   Has had some vomiting since starting the ozempic   Was seen in the ED on 8/24/2023 for N/V-- and Hematemesis   Vomiting        Vomiting after using his pen for his migraines.  Pt states that the vomit looks like coffee grounds.     He is seeing GI -- Cecilia Schuster--- scheduled for EGD and colonoscopy   Denies NSAID use       DIABETES COMPLICATIONS: peripheral neuropathy and peripheral vascular disease      Diabetes Management Status    ASA:  Yes - 81 mg daily     Statin: Taking-- Lipitor 80 mg nightly + fish oil   ACE/ARB: Not taking    The 10-year ASCVD risk score (Josiah ARAGON, et al., 2019) is: 16.1%    Values used to calculate the score:      Age: 58 years      Sex: Male      Is Non- : No      Diabetic: Yes      Tobacco smoker: No      Systolic Blood Pressure: 117 mmHg      Is BP treated: Yes      HDL Cholesterol: 40 mg/dL      Total Cholesterol: 170 mg/dL      Screening or Prevention Patient's value Goal Complete/Controlled?   HgA1C Testing and Control   Lab Results   Component Value Date    HGBA1C 6.5 (H) 09/07/2023      Annually/Less than 8% No   Lipid profile : 09/07/2023 Annually No   LDL control Lab Results   Component Value Date    LDLCALC  97.0 09/07/2023    Annually/Less than 100 mg/dl  No   Nephropathy screening Lab Results   Component Value Date    LABMICR 16.0 06/19/2023     Lab Results   Component Value Date    PROTEINUA Negative 05/31/2017    Annually No   Blood pressure BP Readings from Last 1 Encounters:   09/14/23 117/71    Less than 140/90 No   Dilated retinal exam Most Recent Eye Exam Date: Not Found Annually Yes   Foot exam   : 06/14/2023 Annually No       CURRENT A1C:    Hemoglobin A1C   Date Value Ref Range Status   09/07/2023 6.5 (H) 4.0 - 5.6 % Final     Comment:     ADA Screening Guidelines:  5.7-6.4%  Consistent with prediabetes  >or=6.5%  Consistent with diabetes    High levels of fetal hemoglobin interfere with the HbA1C  assay. Heterozygous hemoglobin variants (HbS, HgC, etc)do  not significantly interfere with this assay.   However, presence of multiple variants may affect accuracy.     06/19/2023 6.6 (H) 4.0 - 5.6 % Final     Comment:     ADA Screening Guidelines:  5.7-6.4%  Consistent with prediabetes  >or=6.5%  Consistent with diabetes    High levels of fetal hemoglobin interfere with the HbA1C  assay. Heterozygous hemoglobin variants (HbS, HgC, etc)do  not significantly interfere with this assay.   However, presence of multiple variants may affect accuracy.     05/31/2017 7.2 (H) 4.5 - 6.2 % Final     Comment:     According to ADA guidelines, hemoglobin A1C <7.0% represents  optimal control in non-pregnant diabetic patients.  Different  metrics may apply to specific populations.   Standards of Medical Care in Diabetes - 2016.  For the purpose of screening for the presence of diabetes:  <5.7%     Consistent with the absence of diabetes  5.7-6.4%  Consistent with increasing risk for diabetes   (prediabetes)  >or=6.5%  Consistent with diabetes  Currently no consensus exists for use of hemoglobin A1C  for diagnosis of diabetes for children.         GOAL A1C: 6.5-7% without hypoglycemia     DM MEDICATIONS USED IN THE PAST:  "Metformin    Jardiance -- caused his BG readings to "go up"   Pioglitazone  Glimepiride    Januvia?  Ozempic       CURRENT DIABETES MEDICATIONS: Metformin 1000 mg BID   Jardiance 25 mg daily   Ozempic 0.5 mg weekly   Insulin: N/A   Missed doses: rarely       BLOOD GLUCOSE MONITORING:  he checks his BG daily in the AM   Presents with logs for review                     HYPOGLYCEMIA:  No        MEALS: eating 3 meals per day   He likes salads with SF dressing   Eating fiber   Likes veggies   Snack: likes chocolate   Drinks: regular cokes- 1/2 20 oz   6-12 bottles of water per day        CURRENT EXERCISE:  No      Review of Systems  Review of Systems   Constitutional:  Negative for appetite change, fatigue and unexpected weight change.   HENT:  Negative for trouble swallowing.    Eyes:  Negative for visual disturbance.   Respiratory:  Negative for shortness of breath.    Cardiovascular:  Negative for chest pain.   Gastrointestinal:  Positive for constipation and vomiting. Negative for nausea.   Endocrine: Negative for polydipsia, polyphagia and polyuria.   Genitourinary:         No Nocturia    Musculoskeletal:  Positive for arthralgias (right knee pain).   Skin:  Negative for wound.   Neurological:  Positive for numbness and headaches. Negative for dizziness.       Physical Exam   Physical Exam  Vitals and nursing note reviewed.   Constitutional:       Appearance: He is well-developed. He is obese.   HENT:      Head: Normocephalic and atraumatic.      Right Ear: External ear normal.      Left Ear: External ear normal.      Nose: Nose normal.   Neck:      Thyroid: No thyromegaly.      Trachea: No tracheal deviation.   Cardiovascular:      Rate and Rhythm: Normal rate and regular rhythm.      Heart sounds: No murmur heard.  Pulmonary:      Effort: Pulmonary effort is normal. No respiratory distress.      Breath sounds: Normal breath sounds.   Abdominal:      Palpations: Abdomen is soft.      Tenderness: There is no " abdominal tenderness.      Hernia: No hernia is present.   Musculoskeletal:      Cervical back: Normal range of motion and neck supple.   Skin:     General: Skin is warm and dry.      Capillary Refill: Capillary refill takes less than 2 seconds.      Findings: No rash.   Neurological:      Mental Status: He is alert and oriented to person, place, and time.      Cranial Nerves: No cranial nerve deficit.   Psychiatric:         Behavior: Behavior normal.         Judgment: Judgment normal.         FOOT EXAMINATION: Appropriate footwear       Lab Results   Component Value Date    TSH 1.678 06/19/2023           Type 2 diabetes mellitus with hyperglycemia, without long-term current use of insulin  Controlled   A1c well controlled in blood sugar readings are at goal on log review  I am concerned about side effects from Ozempic--he is reporting vomiting  We will change his Ozempic to Mounjaro---he is to notify me how things are after 4 weeks on the Mounjaro--send portal message      -- Medication Changes:   Continue Metformin 1000 mg 2 times per day     Continue Jardiance 25 mg daily   -- Please let us know if you have nausea, vomiting, or weakness with a normal BG. This could be euglycemic DKA.  -- please hold medication 3 days prior to surgery or if you are sick and have decreased intake.   -- Please drink lots of water daily while on this medication.   --This medication could also give you a yeast infection- please let us know if this occurs and we can treat it.     Stop Ozempic   Change to Mounjaro   Start with 2.5 mg weekly x 4 weeks-- touch base-- if doing ok -- will increase to 5 mg weekly   Please notify me for any abdominal pain, nausea, vomiting, diarrhea, acid reflux, constipation         -- Reviewed goals of therapy are to get the best control we can without hypoglycemia.  -- Advised frequent self blood glucose monitoring.  Patient encouraged to document glucose results and bring them to every clinic visit. 1-2  times per day at alternating times   -- Hypoglycemia precautions discussed. Instructed on precautions before driving.    -- Call for Bg repeatedly < 70 or > 200.   -- Close adherence to lifestyle changes recommended.   -- Periodic follow ups for eye evaluations, foot care and dental care suggested.          Type 2 diabetes mellitus with diabetic polyneuropathy, without long-term current use of insulin  Optimize BG readings.   See above.     Educated patient to check feet daily for any foreign objects and/or wounds. Discussed with patient the importance of wearing appropriate footwear at all times, not to walk barefoot ever, and to check shoes before putting them on feet. Instructed patient to keep feet dry by regularly changing shoes and socks and drying feet after baths and exercises. Also, instructed patient to report any new lesions, discolorations, or swelling to a healthcare professional.        Primary hypertension  BP goal is < 140/90.   Controlled   Blood pressure goals discussed with patient      Dyslipidemia, goal LDL below 100  On statin per ADA recommendations  LDL goal < 100. LDL at goal. LFTs WNL. Continue statin.           Chronic venous insufficiency of lower extremity  Optimize BG readings.   See above.       Class 1 obesity due to excess calories with serious comorbidity and body mass index (BMI) of 30.0 to 30.9 in adult  Body mass index is 30.68 kg/m².  Increases insulin resistance.   Discussed DM diet and exercise.         I spent a total of 30 minutes on the day of the visit.This includes face to face time and non-face to face time preparing to see the patient (eg, review of tests), obtaining and/or reviewing separately obtained history, documenting clinical information in the electronic or other health record, independently interpreting results and communicating results to the patient/family/caregiver, or care coordinator.        Follow up in about 4 months (around 1/14/2024).  Follow up with  me in 4 months with labs prior       Orders Placed This Encounter   Procedures    Hemoglobin A1C     Standing Status:   Future     Standing Expiration Date:   3/14/2025    Basic Metabolic Panel     Standing Status:   Future     Standing Expiration Date:   3/14/2025       Recommendations were discussed with the patient in detail  The patient verbalized understanding and agrees with the plan outlined as above.     This note was partly generated with i[employer] voice recognition software. I apologize for any possible typographical errors.

## 2023-09-14 NOTE — PATIENT INSTRUCTIONS
Continue Metformin 1000 mg 2 times per day     Continue Jardiance 25 mg daily   -- Please let us know if you have nausea, vomiting, or weakness with a normal BG. This could be euglycemic DKA.  -- please hold medication 3 days prior to surgery or if you are sick and have decreased intake.   -- Please drink lots of water daily while on this medication.   --This medication could also give you a yeast infection- please let us know if this occurs and we can treat it.     Stop Ozempic   Change to Mounjaro   Start with 2.5 mg weekly x 4 weeks-- touch base-- if doing ok -- will increase to 5 mg weekly   Please notify me for any abdominal pain, nausea, vomiting, diarrhea, acid reflux, constipation

## 2023-10-02 ENCOUNTER — TELEPHONE (OUTPATIENT)
Dept: DIABETES | Facility: CLINIC | Age: 58
End: 2023-10-02
Payer: MEDICAID

## 2023-10-02 NOTE — TELEPHONE ENCOUNTER
----- Message from Marisela Garcia sent at 10/2/2023  3:48 PM CDT -----  Regarding: Prior Auth  Contact: Reynaldo  Pt: Reynaldo Chong    Contact Number: [phone] (home)           Pt is calling to get an prior Auth for the following medication from [employer]. Please advise. Requesting a call back.    ACCU-CHEK GUIDE TEST STRIPS Strp       lancets Misc

## 2023-10-06 PROBLEM — Z86.010 HISTORY OF COLON POLYPS: Status: ACTIVE | Noted: 2023-10-06

## 2023-10-06 PROBLEM — Z86.0100 HISTORY OF COLON POLYPS: Status: ACTIVE | Noted: 2023-10-06

## 2023-10-11 ENCOUNTER — TELEPHONE (OUTPATIENT)
Dept: DIABETES | Facility: CLINIC | Age: 58
End: 2023-10-11
Payer: MEDICAID

## 2023-10-11 NOTE — TELEPHONE ENCOUNTER
Pt stated his accu check strips were denied, stated we would reach out to insurance to check on the status of strips

## 2023-10-12 ENCOUNTER — TELEPHONE (OUTPATIENT)
Dept: DIABETES | Facility: CLINIC | Age: 58
End: 2023-10-12
Payer: MEDICAID

## 2023-10-12 NOTE — TELEPHONE ENCOUNTER
Spoke to pt stated he was able to speak to someone and was told for office to try sending rx in again and completing a PA in November due to pharmaceutical company leaving insurance company, stated strips should be covered in November, stated he has enough accu- check strips to last until late November, stated he would call us back in November

## 2023-10-17 ENCOUNTER — TELEPHONE (OUTPATIENT)
Dept: DIABETES | Facility: CLINIC | Age: 58
End: 2023-10-17
Payer: MEDICAID

## 2023-10-17 RX ORDER — BLOOD SUGAR DIAGNOSTIC
1 STRIP MISCELLANEOUS DAILY
Qty: 50 STRIP | Refills: 11 | Status: SHIPPED | OUTPATIENT
Start: 2023-10-17 | End: 2023-12-08 | Stop reason: SDUPTHER

## 2023-10-17 NOTE — TELEPHONE ENCOUNTER
He reports that he spoke to his insurance company today   No RX needed right now  He is going to wait until mid November when his insurance changes pharmacy preferences  He also said that we can continue the Accucheck guide strips for once a day -- he asked that I send over daily strips and his insurance said it would be covered

## 2023-10-17 NOTE — TELEPHONE ENCOUNTER
----- Message from Stacia Batista sent at 10/17/2023  1:55 PM CDT -----  Regarding: advise / caller:Kizzy Sow/ appeal  Contact: Yasir @ [phone] (cell)  Caller Yasir (female) with [employer] is calling asking to speak with someone in the office. Caller states that pt is filing an appeal for blood glucose test strips (accuguide test strips) and she is needing clinical notes / info as to why pt is not using the preferred ones that were recommended.     Please fax clinical notes / info to: [phone] and put attention: Yasir. Caller is asking for a return call back to advise.

## 2023-10-17 NOTE — TELEPHONE ENCOUNTER
----- Message from Jumana De MA sent at 10/17/2023  2:31 PM CDT -----  Regarding: FW: advise / caller:Kizzy Sow/ phil  Contact: Yasir @ [phone] (cell)  Pt is appealing the denial for Accu Chek test strips.  The preferred are: True Metrix or Relion.  Insurance is requesting documentation in chart notes that gives the reason the patient is not using the preferred.  ----- Message -----  From: Stacia Batista  Sent: 10/17/2023   1:59 PM CDT  To: [facility] Staff  Subject: advise / caller:Kizzy Sow/ appeal             CallerYasir (female) with [employer] is calling asking to speak with someone in the office. Caller states that pt is filing an appeal for blood glucose test strips (accuguide test strips) and she is needing clinical notes / info as to why pt is not using the preferred ones that were recommended.     Please fax clinical notes / info to: [phone] and put attention: Yasir. Caller is asking for a return call back to advise.

## 2023-10-23 ENCOUNTER — OFFICE VISIT (OUTPATIENT)
Dept: CARDIOLOGY | Facility: CLINIC | Age: 58
End: 2023-10-23
Payer: MEDICAID

## 2023-10-23 VITALS
SYSTOLIC BLOOD PRESSURE: 126 MMHG | HEIGHT: 71 IN | DIASTOLIC BLOOD PRESSURE: 78 MMHG | BODY MASS INDEX: 30.98 KG/M2 | WEIGHT: 221.31 LBS | HEART RATE: 72 BPM | OXYGEN SATURATION: 96 %

## 2023-10-23 DIAGNOSIS — I10 PRIMARY HYPERTENSION: ICD-10-CM

## 2023-10-23 DIAGNOSIS — D22.9 CHANGE IN SKIN MOLE: Primary | ICD-10-CM

## 2023-10-23 PROCEDURE — 3061F NEG MICROALBUMINURIA REV: CPT | Mod: CPTII,,, | Performed by: NURSE PRACTITIONER

## 2023-10-23 PROCEDURE — 99214 OFFICE O/P EST MOD 30 MIN: CPT | Mod: PBBFAC,PN | Performed by: NURSE PRACTITIONER

## 2023-10-23 PROCEDURE — 1160F RVW MEDS BY RX/DR IN RCRD: CPT | Mod: CPTII,,, | Performed by: NURSE PRACTITIONER

## 2023-10-23 PROCEDURE — 3044F PR MOST RECENT HEMOGLOBIN A1C LEVEL <7.0%: ICD-10-PCS | Mod: CPTII,,, | Performed by: NURSE PRACTITIONER

## 2023-10-23 PROCEDURE — 1160F PR REVIEW ALL MEDS BY PRESCRIBER/CLIN PHARMACIST DOCUMENTED: ICD-10-PCS | Mod: CPTII,,, | Performed by: NURSE PRACTITIONER

## 2023-10-23 PROCEDURE — 3061F PR NEG MICROALBUMINURIA RESULT DOCUMENTED/REVIEW: ICD-10-PCS | Mod: CPTII,,, | Performed by: NURSE PRACTITIONER

## 2023-10-23 PROCEDURE — 3074F SYST BP LT 130 MM HG: CPT | Mod: CPTII,,, | Performed by: NURSE PRACTITIONER

## 2023-10-23 PROCEDURE — 3078F PR MOST RECENT DIASTOLIC BLOOD PRESSURE < 80 MM HG: ICD-10-PCS | Mod: CPTII,,, | Performed by: NURSE PRACTITIONER

## 2023-10-23 PROCEDURE — 3066F PR DOCUMENTATION OF TREATMENT FOR NEPHROPATHY: ICD-10-PCS | Mod: CPTII,,, | Performed by: NURSE PRACTITIONER

## 2023-10-23 PROCEDURE — 3044F HG A1C LEVEL LT 7.0%: CPT | Mod: CPTII,,, | Performed by: NURSE PRACTITIONER

## 2023-10-23 PROCEDURE — 3008F BODY MASS INDEX DOCD: CPT | Mod: CPTII,,, | Performed by: NURSE PRACTITIONER

## 2023-10-23 PROCEDURE — 3078F DIAST BP <80 MM HG: CPT | Mod: CPTII,,, | Performed by: NURSE PRACTITIONER

## 2023-10-23 PROCEDURE — 3074F PR MOST RECENT SYSTOLIC BLOOD PRESSURE < 130 MM HG: ICD-10-PCS | Mod: CPTII,,, | Performed by: NURSE PRACTITIONER

## 2023-10-23 PROCEDURE — 99999 PR PBB SHADOW E&M-EST. PATIENT-LVL IV: CPT | Mod: PBBFAC,,, | Performed by: NURSE PRACTITIONER

## 2023-10-23 PROCEDURE — 1159F PR MEDICATION LIST DOCUMENTED IN MEDICAL RECORD: ICD-10-PCS | Mod: CPTII,,, | Performed by: NURSE PRACTITIONER

## 2023-10-23 PROCEDURE — 99212 OFFICE O/P EST SF 10 MIN: CPT | Mod: S$PBB,,, | Performed by: NURSE PRACTITIONER

## 2023-10-23 PROCEDURE — 1159F MED LIST DOCD IN RCRD: CPT | Mod: CPTII,,, | Performed by: NURSE PRACTITIONER

## 2023-10-23 PROCEDURE — 3066F NEPHROPATHY DOC TX: CPT | Mod: CPTII,,, | Performed by: NURSE PRACTITIONER

## 2023-10-23 PROCEDURE — 3008F PR BODY MASS INDEX (BMI) DOCUMENTED: ICD-10-PCS | Mod: CPTII,,, | Performed by: NURSE PRACTITIONER

## 2023-10-23 PROCEDURE — 99212 PR OFFICE/OUTPT VISIT, EST, LEVL II, 10-19 MIN: ICD-10-PCS | Mod: S$PBB,,, | Performed by: NURSE PRACTITIONER

## 2023-10-23 PROCEDURE — 99999 PR PBB SHADOW E&M-EST. PATIENT-LVL IV: ICD-10-PCS | Mod: PBBFAC,,, | Performed by: NURSE PRACTITIONER

## 2023-10-23 RX ORDER — ONDANSETRON 4 MG/1
4 TABLET, FILM COATED ORAL EVERY 8 HOURS PRN
COMMUNITY
Start: 2023-09-18

## 2023-10-23 RX ORDER — ONABOTULINUMTOXINA 200 [USP'U]/1
INJECTION, POWDER, LYOPHILIZED, FOR SOLUTION INTRADERMAL; INTRAMUSCULAR
COMMUNITY
Start: 2023-09-07

## 2023-10-23 RX ORDER — OFLOXACIN 3 MG/ML
SOLUTION AURICULAR (OTIC)
COMMUNITY
Start: 2023-10-16

## 2023-10-23 RX ORDER — TAMSULOSIN HYDROCHLORIDE 0.4 MG/1
1 CAPSULE ORAL
COMMUNITY
Start: 2023-10-09 | End: 2023-12-27

## 2023-10-23 RX ORDER — VALACYCLOVIR HYDROCHLORIDE 1 G/1
1000 TABLET, FILM COATED ORAL 2 TIMES DAILY
COMMUNITY
Start: 2023-09-26

## 2023-10-23 RX ORDER — AZELASTINE 1 MG/ML
1 SPRAY, METERED NASAL 2 TIMES DAILY
COMMUNITY
Start: 2023-07-06

## 2023-10-23 RX ORDER — FLUTICASONE PROPIONATE 50 MCG
1 SPRAY, SUSPENSION (ML) NASAL 2 TIMES DAILY
COMMUNITY
Start: 2023-07-06

## 2023-10-23 NOTE — PATIENT INSTRUCTIONS
We will reduce the amlodipine 2.5 mg- split your pill in half starting tomorrow and then stop taking it on Friday.     Keep a log of your blood pressures    Follow a low sodium diet- this is important     We will see you back in 2 weeks    I do recommend pulmonary function tests to look at your lung function and a low dose screening CT scan of your lungs based on your smoking history.

## 2023-10-23 NOTE — PROGRESS NOTES
Cardiology    10/23/2023  10:42 AM    Problem list  Patient Active Problem List   Diagnosis    Screening for prostate cancer    Rash    Encounter to establish care with new doctor    Dyslipidemia, goal LDL below 100    Type 2 diabetes mellitus with hyperglycemia, without long-term current use of insulin    Chronic migraine without aura without status migrainosus, not intractable    History of narcotic addiction    Cocaine dependence in remission    Chronic pain due to trauma    History of loop recorder    Primary hypertension    Chronic venous insufficiency of lower extremity    Viral syndrome    Type 2 diabetes mellitus with diabetic polyneuropathy, without long-term current use of insulin    Class 1 obesity due to excess calories with serious comorbidity and body mass index (BMI) of 30.0 to 30.9 in adult    History of colon polyps       CC:  Dizziness    HPI:  Had been vomiting blood due to Ozempic.  /74 up to 127/81, has some dizziness occasionally. Interested in reducing medications- namely the Amlodipine 2.5 mg.     Medications  Current Outpatient Medications   Medication Sig Dispense Refill    ACCU-CHEK GUIDE TEST STRIPS Strp 1 strip by Misc.(Non-Drug; Combo Route) route once daily. 50 strip 11    amitriptyline (ELAVIL) 10 MG tablet Take 20 mg by mouth every evening. 10-30 mg at night      amLODIPine (NORVASC) 2.5 MG tablet Take 2.5 mg by mouth nightly.      aspirin (ECOTRIN) 81 MG EC tablet Take 81 mg by mouth once daily. On hold this week due to Botox for migranes scheduled      atorvastatin (LIPITOR) 80 MG tablet Take 40 mg by mouth every evening.      BOTOX 200 unit SolR SMARTSI Unit(s) injection Every 12 Weeks      cyanocobalamin 1,000 mcg/mL injection Inject 1,000 mcg into the muscle every 30 days.      EMGALITY  mg/mL PnIj Inject into the skin every 30 days. Between -      empagliflozin (JARDIANCE) 25 mg tablet Take 1 tablet (25 mg total) by mouth once daily. 30 tablet 6     fish oil-omega-3 fatty acids 300-1,000 mg capsule Take 1 g by mouth once daily. On hold this week for procedure      ibuprofen (ADVIL,MOTRIN) 800 MG tablet Take 800 mg by mouth 3 (three) times daily. PRN      lancets Misc To use to check blood sugar 2 times daily. To use with Accu check fastclix lancet device - needs barrels 100 each 3    metFORMIN (GLUCOPHAGE) 1000 MG tablet TAKE 1 TABLET BY MOUTH TWICE DAILY WITH MEALS 60 tablet 5    multivitamin capsule Take 1 capsule by mouth once daily.      ofloxacin (FLOXIN) 0.3 % otic solution SMARTSI Drop(s) In Ear(s) Every 12 Hours      ondansetron (ZOFRAN) 4 MG tablet Take 4 mg by mouth every 8 (eight) hours as needed.      pantoprazole (PROTONIX) 40 MG tablet Take 1 tablet (40 mg total) by mouth once daily. 30 tablet 11    sildenafiL (VIAGRA) 100 MG tablet Take 1 tablet (100 mg total) by mouth daily as needed for Erectile Dysfunction. 20 tablet 11    tamsulosin (FLOMAX) 0.4 mg Cap Take 1 capsule by mouth.      tirzepatide (MOUNJARO) 2.5 mg/0.5 mL PnIj Inject 2.5 mg into the skin every 7 days. 4 pen 0    topiramate (TOPAMAX) 100 MG tablet Take 100 mg by mouth 2 (two) times daily.      UBRELVY 50 mg tablet Take 50 mg by mouth as needed.      azelastine (ASTELIN) 137 mcg (0.1 %) nasal spray 1 spray 2 (two) times daily.      fluticasone propionate (FLONASE) 50 mcg/actuation nasal spray 1 spray by Each Nostril route 2 (two) times daily.      valACYclovir (VALTREX) 1000 MG tablet Take 1,000 mg by mouth 2 (two) times daily.       No current facility-administered medications for this visit.     Facility-Administered Medications Ordered in Other Visits   Medication Dose Route Frequency Provider Last Rate Last Admin    lactated ringers infusion   Intravenous Continuous Floyd Schuster MD        lactated ringers infusion   Intravenous Continuous Floyd Schuster MD        sodium chloride 0.9% flush 10 mL  10 mL Intravenous PRN Floyd Schuster MD        sodium chloride 0.9%  flush 3 mL  3 mL Intravenous PRN Floyd Schuster MD          Prior to Admission medications    Medication Sig Start Date End Date Taking? Authorizing Provider   ACCU-CHEK GUIDE TEST STRIPS Strp 1 strip by Misc.(Non-Drug; Combo Route) route once daily. 10/17/23  Yes Yadira Presley NP   amitriptyline (ELAVIL) 10 MG tablet Take 20 mg by mouth every evening. 10-30 mg at night 23  Yes Provider, Historical   amLODIPine (NORVASC) 2.5 MG tablet Take 2.5 mg by mouth nightly. 22  Yes Provider, Historical   aspirin (ECOTRIN) 81 MG EC tablet Take 81 mg by mouth once daily. On hold this week due to Botox for migranes scheduled   Yes Provider, Historical   atorvastatin (LIPITOR) 80 MG tablet Take 40 mg by mouth every evening. 23  Yes Provider, Historical   BOTOX 200 unit SolR SMARTSI Unit(s) injection Every 12 Weeks 23  Yes Provider, Historical   cyanocobalamin 1,000 mcg/mL injection Inject 1,000 mcg into the muscle every 30 days. 23  Yes Provider, Historical   EMGALITY  mg/mL PnIj Inject into the skin every 30 days. Between -23  Yes Provider, Historical   empagliflozin (JARDIANCE) 25 mg tablet Take 1 tablet (25 mg total) by mouth once daily. 23  Yes Yadira Presley NP   fish oil-omega-3 fatty acids 300-1,000 mg capsule Take 1 g by mouth once daily. On hold this week for procedure   Yes Provider, Historical   ibuprofen (ADVIL,MOTRIN) 800 MG tablet Take 800 mg by mouth 3 (three) times daily. PRN 23  Yes Provider, Historical   lancets Misc To use to check blood sugar 2 times daily. To use with Accu check fastclix lancet device - needs barrels 23  Yes Yadira Presley NP   metFORMIN (GLUCOPHAGE) 1000 MG tablet TAKE 1 TABLET BY MOUTH TWICE DAILY WITH MEALS 18  Yes Pepito Cruz MD   multivitamin capsule Take 1 capsule by mouth once daily.   Yes Provider, Historical   ofloxacin (FLOXIN) 0.3 % otic solution SMARTSI Drop(s) In Ear(s) Every 12 Hours 10/16/23  Yes  Provider, Historical   ondansetron (ZOFRAN) 4 MG tablet Take 4 mg by mouth every 8 (eight) hours as needed. 9/18/23  Yes Provider, Historical   pantoprazole (PROTONIX) 40 MG tablet Take 1 tablet (40 mg total) by mouth once daily. 9/22/23 9/21/24 Yes Floyd Schuster MD   sildenafiL (VIAGRA) 100 MG tablet Take 1 tablet (100 mg total) by mouth daily as needed for Erectile Dysfunction. 6/5/23  Yes Sabra Leo NP   tamsulosin (FLOMAX) 0.4 mg Cap Take 1 capsule by mouth. 10/9/23  Yes Provider, Historical   tirzepatide (MOUNJARO) 2.5 mg/0.5 mL PnIj Inject 2.5 mg into the skin every 7 days. 9/14/23  Yes Yadira Presley NP   topiramate (TOPAMAX) 100 MG tablet Take 100 mg by mouth 2 (two) times daily. 7/16/22  Yes Provider, Historical   UBRELVY 50 mg tablet Take 50 mg by mouth as needed. 5/24/23  Yes Provider, Historical   azelastine (ASTELIN) 137 mcg (0.1 %) nasal spray 1 spray 2 (two) times daily. 7/6/23   Provider, Historical   fluticasone propionate (FLONASE) 50 mcg/actuation nasal spray 1 spray by Each Nostril route 2 (two) times daily. 7/6/23   Provider, Historical   valACYclovir (VALTREX) 1000 MG tablet Take 1,000 mg by mouth 2 (two) times daily. 9/26/23   Provider, Historical         History  Past Medical History:   Diagnosis Date    Anxiety     BPH (benign prostatic hyperplasia)     Depression     Diabetes mellitus, type 2     ED (erectile dysfunction)     Enlarged prostate     Hypertension     Migraines     Mixed hyperlipidemia     Peptic ulcer     Personal history of colonic polyps     Personal history of colonic polyps 10/06/2023     Past Surgical History:   Procedure Laterality Date    broken arm  Right 1976    COLONOSCOPY  2016    COLONOSCOPY W/ POLYPECTOMY  10/06/2023    COLONOSCOPY, WITH POLYPECTOMY USING SNARE N/A 10/6/2023    Procedure: COLONOSCOPY, WITH POLYPECTOMY USING SNARE;  Surgeon: Floyd Schuster MD;  Location: [facility];  Service: Endoscopy;  Laterality: N/A;    EGD, WITH CLOSED BIOPSY   09/22/2023    ELBOW DEBRIDEMENT Left 2013    bone spur     ESOPHAGOGASTRODUODENOSCOPY N/A 09/22/2023    Procedure: EGD (ESOPHAGOGASTRODUODENOSCOPY);  Surgeon: Floyd Schuster MD;  Location: [facility] ENDO;  Service: Endoscopy;  Laterality: N/A;    KNEE CARTILAGE SURGERY Right 1997    NECK SURGERY  2016    c4 c5 c6 plate     PROSTATE SURGERY      staples put in prostate    REMOVAL OF IMPLANTABLE LOOP RECORDER Left 04/05/2023    Procedure: REMOVAL, IMPLANTABLE LOOP RECORDER;  Surgeon: Cristóbal Becker MD;  Location: [facility] CATH LAB;  Service: Cardiology;  Laterality: Left;    SHOULDER ARTHROSCOPY Right 2015    reconstruction     THUMB ARTHROSCOPY Right 2015    reconstructive     TONSILLECTOMY  1969     Social History     Socioeconomic History    Marital status: Single   Tobacco Use    Smoking status: Former     Current packs/day: 0.30     Types: Cigarettes    Smokeless tobacco: Never   Substance and Sexual Activity    Alcohol use: No     Comment: hx of     Drug use: Not Currently     Types: Cocaine     Comment: hx of     Sexual activity: Not Currently     Partners: Female   Social History Narrative    ** Merged History Encounter **              Allergies  Review of patient's allergies indicates:   Allergen Reactions    Pcn [penicillins]          Review of Systems   Review of Systems   Constitutional: Negative for diaphoresis and malaise/fatigue.   HENT: Negative.     Cardiovascular:  Negative for chest pain, claudication, dyspnea on exertion, irregular heartbeat, leg swelling, near-syncope, orthopnea, palpitations, paroxysmal nocturnal dyspnea and syncope.   Respiratory:  Negative for shortness of breath.    Endocrine: Negative for polydipsia, polyphagia and polyuria.   Hematologic/Lymphatic: Does not bruise/bleed easily.   Gastrointestinal:  Negative for bloating, nausea and vomiting.   Genitourinary: Negative.    Neurological:  Positive for dizziness. Negative for excessive daytime sleepiness, light-headedness, loss of  balance and weakness.   Psychiatric/Behavioral:  The patient is not nervous/anxious.    Allergic/Immunologic: Negative.          Physical Exam  Wt Readings from Last 1 Encounters:   10/23/23 100.4 kg (221 lb 5.5 oz)     BP Readings from Last 3 Encounters:   10/23/23 126/78   10/06/23 122/78   09/22/23 134/85     Pulse Readings from Last 1 Encounters:   10/23/23 72     Body mass index is 30.87 kg/m².    Physical Exam  Vitals and nursing note reviewed.   Constitutional:       Appearance: Normal appearance.   HENT:      Head: Normocephalic and atraumatic.      Mouth/Throat:      Mouth: Mucous membranes are moist.   Eyes:      Pupils: Pupils are equal, round, and reactive to light.   Cardiovascular:      Rate and Rhythm: Normal rate and regular rhythm.      Pulses:           Radial pulses are 2+ on the right side and 2+ on the left side.        Dorsalis pedis pulses are 2+ on the right side and 2+ on the left side.        Posterior tibial pulses are 2+ on the right side and 2+ on the left side.      Heart sounds: No murmur heard.  Pulmonary:      Effort: Pulmonary effort is normal. No respiratory distress.      Breath sounds: Normal breath sounds.   Abdominal:      General: There is no distension.      Tenderness: There is no abdominal tenderness.   Musculoskeletal:      Cervical back: Normal range of motion.      Right lower leg: No edema.      Left lower leg: No edema.   Skin:     General: Skin is warm and dry.      Findings: No erythema.   Neurological:      General: No focal deficit present.      Mental Status: He is alert.   Psychiatric:         Mood and Affect: Mood normal.         Behavior: Behavior normal.       Problem List Items Addressed This Visit          Cardiac/Vascular    Primary hypertension    Overview     Trial amlodipine vacation  Keep BP log  Low Na diet         Current Assessment & Plan     As above          Other Visit Diagnoses       Change in skin mole    -  Primary    Relevant Orders     Ambulatory referral/consult to Dermatology                  Follow Up  2 week BP check  Recommend PFTs and LDCT      @Katelyn Singh DNP

## 2023-11-02 ENCOUNTER — CLINICAL SUPPORT (OUTPATIENT)
Dept: DIABETES | Facility: CLINIC | Age: 58
End: 2023-11-02
Payer: MEDICAID

## 2023-11-02 ENCOUNTER — TELEPHONE (OUTPATIENT)
Dept: DIABETES | Facility: CLINIC | Age: 58
End: 2023-11-02
Payer: MEDICAID

## 2023-11-02 DIAGNOSIS — E11.65 TYPE 2 DIABETES MELLITUS WITH HYPERGLYCEMIA, UNSPECIFIED WHETHER LONG TERM INSULIN USE: Primary | ICD-10-CM

## 2023-11-02 NOTE — PROGRESS NOTES
Pt came into clinic, appeal process initiated for accu check testing strips for pt to be able to test his blood sugar daily to better control his blood sugar readings.

## 2023-11-06 ENCOUNTER — TELEPHONE (OUTPATIENT)
Dept: DIABETES | Facility: CLINIC | Age: 58
End: 2023-11-06
Payer: MEDICAID

## 2023-11-06 ENCOUNTER — TELEPHONE (OUTPATIENT)
Dept: CARDIOLOGY | Facility: CLINIC | Age: 58
End: 2023-11-06
Payer: MEDICAID

## 2023-11-06 NOTE — TELEPHONE ENCOUNTER
----- Message from Jessica Love sent at 11/6/2023  9:25 AM CST -----  Regarding: come in or phone visit?  Contact: [phone]  [employer] calling regarding Patient Advice (message) want to know if his appt a come in visit or a phone visit?

## 2023-11-07 ENCOUNTER — CLINICAL SUPPORT (OUTPATIENT)
Dept: CARDIOLOGY | Facility: CLINIC | Age: 58
End: 2023-11-07
Payer: MEDICAID

## 2023-11-07 VITALS
WEIGHT: 222.75 LBS | SYSTOLIC BLOOD PRESSURE: 124 MMHG | OXYGEN SATURATION: 95 % | HEIGHT: 71 IN | BODY MASS INDEX: 31.19 KG/M2 | DIASTOLIC BLOOD PRESSURE: 75 MMHG | HEART RATE: 68 BPM

## 2023-11-07 DIAGNOSIS — I10 PRIMARY HYPERTENSION: Primary | ICD-10-CM

## 2023-11-07 PROCEDURE — 99999 PR PBB SHADOW E&M-EST. PATIENT-LVL V: CPT | Mod: PBBFAC,,,

## 2023-11-07 PROCEDURE — 99215 OFFICE O/P EST HI 40 MIN: CPT | Mod: PBBFAC,PN

## 2023-11-12 ENCOUNTER — PATIENT MESSAGE (OUTPATIENT)
Dept: DIABETES | Facility: CLINIC | Age: 58
End: 2023-11-12
Payer: MEDICAID

## 2023-11-27 DIAGNOSIS — E11.65 TYPE 2 DIABETES MELLITUS WITH HYPERGLYCEMIA, WITHOUT LONG-TERM CURRENT USE OF INSULIN: ICD-10-CM

## 2023-11-27 RX ORDER — TIRZEPATIDE 2.5 MG/.5ML
INJECTION, SOLUTION SUBCUTANEOUS
Refills: 0 | OUTPATIENT
Start: 2023-11-27

## 2023-11-27 RX ORDER — TIRZEPATIDE 5 MG/.5ML
5 INJECTION, SOLUTION SUBCUTANEOUS
Qty: 4 PEN | Refills: 1 | Status: SHIPPED | OUTPATIENT
Start: 2023-11-27 | End: 2024-01-09 | Stop reason: SDUPTHER

## 2023-12-08 RX ORDER — BLOOD SUGAR DIAGNOSTIC
1 STRIP MISCELLANEOUS DAILY
Qty: 50 STRIP | Refills: 11 | Status: SHIPPED | OUTPATIENT
Start: 2023-12-08 | End: 2024-01-17

## 2023-12-12 DIAGNOSIS — E11.65 TYPE 2 DIABETES MELLITUS WITH HYPERGLYCEMIA, WITHOUT LONG-TERM CURRENT USE OF INSULIN: ICD-10-CM

## 2023-12-27 ENCOUNTER — TELEPHONE (OUTPATIENT)
Dept: DIABETES | Facility: CLINIC | Age: 58
End: 2023-12-27
Payer: MEDICAID

## 2023-12-27 ENCOUNTER — NURSE TRIAGE (OUTPATIENT)
Dept: ADMINISTRATIVE | Facility: CLINIC | Age: 58
End: 2023-12-27
Payer: MEDICAID

## 2023-12-27 DIAGNOSIS — E11.65 TYPE 2 DIABETES MELLITUS WITH HYPERGLYCEMIA, WITHOUT LONG-TERM CURRENT USE OF INSULIN: Primary | ICD-10-CM

## 2023-12-27 DIAGNOSIS — R30.0 DYSURIA: ICD-10-CM

## 2023-12-27 RX ORDER — INSULIN PUMP SYRINGE, 3 ML
EACH MISCELLANEOUS
Qty: 1 EACH | Refills: 0 | Status: SHIPPED | OUTPATIENT
Start: 2023-12-27 | End: 2024-01-17 | Stop reason: SDUPTHER

## 2023-12-27 RX ORDER — TAMSULOSIN HYDROCHLORIDE 0.4 MG/1
1 CAPSULE ORAL
Qty: 30 CAPSULE | Refills: 11 | Status: SHIPPED | OUTPATIENT
Start: 2023-12-27 | End: 2024-02-15 | Stop reason: SDUPTHER

## 2023-12-27 RX ORDER — LANCETS
EACH MISCELLANEOUS
Qty: 30 EACH | Refills: 11 | Status: SHIPPED | OUTPATIENT
Start: 2023-12-27 | End: 2024-01-17 | Stop reason: SDUPTHER

## 2023-12-27 NOTE — TELEPHONE ENCOUNTER
Patient calling, states that through the month of October he had to appeal his test strips for his glucometer to be covered through insurance. Has been waiting for prior authorization for his test strips from the provider who manages his diabetes. He has been completely out of test strips for 12 days. Pharmacist has also not been able to get prior authorization. Patient hasn't been able to check his sugar- states he is just going by how he feels. Patient states he is pill and diet controlled but does check his sugar daily.     Dispo is discuss w PCP and callback by nurse within 1 hour patient VU. [place] diabetes management office contacted- Lead Nurse at office, Angel Melara, is handling the prior authorization. Front end personnel left message w provider to please call patient to update him in regard to status of test strip refill. Patient will call back with further needs.      Reason for Disposition   Prescription refill request for ESSENTIAL medicine (i.e., likelihood of harm to patient if not taken) and triager unable to refill per department policy    Protocols used: Medication Refill and Renewal Call-A-OH

## 2023-12-27 NOTE — TELEPHONE ENCOUNTER
----- Message from Jumana De MA sent at 12/27/2023  2:25 PM CST -----  Regarding: FW: Prior Auth Tried to Reach Supervisor  Contact: [phone]    ----- Message -----  From: Keli Tilley  Sent: 12/27/2023   2:17 PM CST  To: Angel Melara LPN; [facility] Staff  Subject: Prior Auth Tried to Reach Supervisor             Reynaldo Chong calling regarding prior auth patient is very upset requesting to speak with a supervisor. I tried to reach out to Angel no answer. My  tried to reach out to Angel via teams.  Pt didn't give me the name of the medication and stated this has been going on for a month. Pt stated he will call Celestino Duff and dropped call.

## 2023-12-27 NOTE — TELEPHONE ENCOUNTER
Spoke with patient. Discussed his request for diabetic testing strips. Patient's conversation was rather caustic regarding his not having the authorization for the testing strips he is requesting. After contacting the insurance I was able to determine that the strips that were approved on 10/30/23 was for one time only and not an ongoing authorization. For the prescription to continue it would require a PA to be done. A PA had been submitted but was denied. An appeal was initiated but withdrawn. The appeal will need to be resumed to get this matter resolved.

## 2023-12-28 ENCOUNTER — TELEPHONE (OUTPATIENT)
Dept: DIABETES | Facility: CLINIC | Age: 58
End: 2023-12-28
Payer: MEDICAID

## 2023-12-28 NOTE — TELEPHONE ENCOUNTER
Spoke to garima stated pt can get a temporary supply until February , state after that pt will need to switch to preferred

## 2023-12-29 ENCOUNTER — TELEPHONE (OUTPATIENT)
Dept: DIABETES | Facility: CLINIC | Age: 58
End: 2023-12-29
Payer: MEDICAID

## 2023-12-29 NOTE — TELEPHONE ENCOUNTER
Spoke to pt informed pt that his insurance stated he can get a temporary supply of accu chek testing strips , informed pt that his insurance stated to use override codes to get the testing strips until February,  explained to pt that after that insurance stated he must switch to preferred alternative, provided pt with override codes, pt asked can we just fax [facility] the override codes, pt also stated he is not worried about testing his blood sugar , stated it doesn't matter if he ends up in the er, advised pt of how important it is to monitor his blood sugars so we were still going to fax the override codes from his insurance to the pharmacy, pt verbalized understanding, override codes faxed to [facility]

## 2024-01-09 RX ORDER — TIRZEPATIDE 5 MG/.5ML
INJECTION, SOLUTION SUBCUTANEOUS
Qty: 4 PEN | Refills: 1 | Status: SHIPPED | OUTPATIENT
Start: 2024-01-09 | End: 2024-01-17

## 2024-01-17 ENCOUNTER — TELEPHONE (OUTPATIENT)
Dept: DIABETES | Facility: CLINIC | Age: 59
End: 2024-01-17
Payer: MEDICAID

## 2024-01-17 ENCOUNTER — OFFICE VISIT (OUTPATIENT)
Dept: DIABETES | Facility: CLINIC | Age: 59
End: 2024-01-17
Payer: MEDICAID

## 2024-01-17 VITALS
OXYGEN SATURATION: 97 % | BODY MASS INDEX: 30.73 KG/M2 | HEART RATE: 65 BPM | HEIGHT: 71 IN | WEIGHT: 219.5 LBS | SYSTOLIC BLOOD PRESSURE: 122 MMHG | DIASTOLIC BLOOD PRESSURE: 78 MMHG

## 2024-01-17 DIAGNOSIS — I87.2 CHRONIC VENOUS INSUFFICIENCY OF LOWER EXTREMITY: ICD-10-CM

## 2024-01-17 DIAGNOSIS — Z71.9 HEALTH EDUCATION/COUNSELING: ICD-10-CM

## 2024-01-17 DIAGNOSIS — E78.5 DYSLIPIDEMIA, GOAL LDL BELOW 100: ICD-10-CM

## 2024-01-17 DIAGNOSIS — E66.09 CLASS 1 OBESITY DUE TO EXCESS CALORIES WITH SERIOUS COMORBIDITY AND BODY MASS INDEX (BMI) OF 30.0 TO 30.9 IN ADULT: ICD-10-CM

## 2024-01-17 DIAGNOSIS — E11.65 TYPE 2 DIABETES MELLITUS WITH HYPERGLYCEMIA, WITHOUT LONG-TERM CURRENT USE OF INSULIN: Primary | ICD-10-CM

## 2024-01-17 DIAGNOSIS — E11.42 TYPE 2 DIABETES MELLITUS WITH DIABETIC POLYNEUROPATHY, WITHOUT LONG-TERM CURRENT USE OF INSULIN: ICD-10-CM

## 2024-01-17 DIAGNOSIS — I10 PRIMARY HYPERTENSION: ICD-10-CM

## 2024-01-17 DIAGNOSIS — E11.42 CONTROLLED TYPE 2 DIABETES MELLITUS WITH DIABETIC POLYNEUROPATHY, WITHOUT LONG-TERM CURRENT USE OF INSULIN: ICD-10-CM

## 2024-01-17 PROBLEM — B34.9 VIRAL SYNDROME: Status: RESOLVED | Noted: 2023-03-11 | Resolved: 2024-01-17

## 2024-01-17 PROCEDURE — 3074F SYST BP LT 130 MM HG: CPT | Mod: CPTII,,, | Performed by: NURSE PRACTITIONER

## 2024-01-17 PROCEDURE — 3008F BODY MASS INDEX DOCD: CPT | Mod: CPTII,,, | Performed by: NURSE PRACTITIONER

## 2024-01-17 PROCEDURE — 99999 PR PBB SHADOW E&M-EST. PATIENT-LVL V: CPT | Mod: PBBFAC,,, | Performed by: NURSE PRACTITIONER

## 2024-01-17 PROCEDURE — 99214 OFFICE O/P EST MOD 30 MIN: CPT | Mod: S$PBB,,, | Performed by: NURSE PRACTITIONER

## 2024-01-17 PROCEDURE — 3078F DIAST BP <80 MM HG: CPT | Mod: CPTII,,, | Performed by: NURSE PRACTITIONER

## 2024-01-17 PROCEDURE — 99215 OFFICE O/P EST HI 40 MIN: CPT | Mod: PBBFAC,PN | Performed by: NURSE PRACTITIONER

## 2024-01-17 PROCEDURE — 1160F RVW MEDS BY RX/DR IN RCRD: CPT | Mod: CPTII,,, | Performed by: NURSE PRACTITIONER

## 2024-01-17 PROCEDURE — 1159F MED LIST DOCD IN RCRD: CPT | Mod: CPTII,,, | Performed by: NURSE PRACTITIONER

## 2024-01-17 PROCEDURE — 3044F HG A1C LEVEL LT 7.0%: CPT | Mod: CPTII,,, | Performed by: NURSE PRACTITIONER

## 2024-01-17 RX ORDER — LANCETS
EACH MISCELLANEOUS
Qty: 30 EACH | Refills: 11 | Status: SHIPPED | OUTPATIENT
Start: 2024-01-17

## 2024-01-17 RX ORDER — INSULIN PUMP SYRINGE, 3 ML
EACH MISCELLANEOUS
Qty: 1 EACH | Refills: 0 | Status: SHIPPED | OUTPATIENT
Start: 2024-01-17

## 2024-01-17 RX ORDER — TIRZEPATIDE 7.5 MG/.5ML
7.5 INJECTION, SOLUTION SUBCUTANEOUS
Qty: 4 PEN | Refills: 1 | Status: SHIPPED | OUTPATIENT
Start: 2024-01-17 | End: 2024-03-01 | Stop reason: DRUGHIGH

## 2024-01-17 RX ORDER — METFORMIN HYDROCHLORIDE 1000 MG/1
1000 TABLET ORAL
Qty: 90 TABLET | Refills: 2 | Status: SHIPPED | OUTPATIENT
Start: 2024-01-17

## 2024-01-17 NOTE — Clinical Note
Patient is going to use a new meter and supplies- so I sent to pharmacy -- just make sure they give it to him

## 2024-01-17 NOTE — PROGRESS NOTES
CC:   Chief Complaint   Patient presents with    Diabetes Mellitus       HPI: Reynaldo Chong is a 58 y.o. male presents for a follow up visit today for the management of T2DM     He was diagnosed with Type 2 diabetes in 2004 at the 24 hour clinic at [facility] - BG was 411. He was started on Metformin     Family hx of diabetes: younger brother, father , grandfather   Hospitalized for diabetes: denies   Insulin therapy: never     No personal or FH of thyroid cancer or personal of pancreatic cancer or pancreatitis.       Our last visit was in September 2023  At that visit we changed the ozempic to Mounjaro - he has titrated up to 5 mg weekly -- denies GI upset. He is asking to increase to next dose   He wants to come off Metformin in the future   A1c has remained at 6.5%   He hasn't been checking his BG as he does not have strips. His insurance will not cover the accuchek test strips that he wants.       DIABETES COMPLICATIONS: peripheral neuropathy and peripheral vascular disease      Diabetes Management Status    ASA:  Yes - 81 mg daily     Statin: Taking-- Lipitor 80 mg nightly + fish oil   ACE/ARB: Not taking    The 10-year ASCVD risk score (Josiah ARAGON, et al., 2019) is: 17.3%    Values used to calculate the score:      Age: 58 years      Sex: Male      Is Non- : No      Diabetic: Yes      Tobacco smoker: No      Systolic Blood Pressure: 122 mmHg      Is BP treated: Yes      HDL Cholesterol: 40 mg/dL      Total Cholesterol: 170 mg/dL      Screening or Prevention Patient's value Goal Complete/Controlled?   HgA1C Testing and Control   Lab Results   Component Value Date    HGBA1C 6.5 (H) 01/10/2024      Annually/Less than 8% No   Lipid profile : 09/07/2023 Annually No   LDL control Lab Results   Component Value Date    LDLCALC 97.0 09/07/2023    Annually/Less than 100 mg/dl  No   Nephropathy screening Lab Results   Component Value Date    LABMICR 16.0 06/19/2023     Lab Results   Component  "Value Date    PROTEINUA Negative 05/31/2017    Annually No   Blood pressure BP Readings from Last 1 Encounters:   01/17/24 122/78    Less than 140/90 No   Dilated retinal exam : 10/05/2023 Annually Yes   Foot exam   : 06/14/2023 Annually No       CURRENT A1C:    Hemoglobin A1C   Date Value Ref Range Status   01/10/2024 6.5 (H) 4.0 - 5.6 % Final     Comment:     ADA Screening Guidelines:  5.7-6.4%  Consistent with prediabetes  >or=6.5%  Consistent with diabetes    High levels of fetal hemoglobin interfere with the HbA1C  assay. Heterozygous hemoglobin variants (HbS, HgC, etc)do  not significantly interfere with this assay.   However, presence of multiple variants may affect accuracy.     09/07/2023 6.5 (H) 4.0 - 5.6 % Final     Comment:     ADA Screening Guidelines:  5.7-6.4%  Consistent with prediabetes  >or=6.5%  Consistent with diabetes    High levels of fetal hemoglobin interfere with the HbA1C  assay. Heterozygous hemoglobin variants (HbS, HgC, etc)do  not significantly interfere with this assay.   However, presence of multiple variants may affect accuracy.     06/19/2023 6.6 (H) 4.0 - 5.6 % Final     Comment:     ADA Screening Guidelines:  5.7-6.4%  Consistent with prediabetes  >or=6.5%  Consistent with diabetes    High levels of fetal hemoglobin interfere with the HbA1C  assay. Heterozygous hemoglobin variants (HbS, HgC, etc)do  not significantly interfere with this assay.   However, presence of multiple variants may affect accuracy.         GOAL A1C: 6.5-7% without hypoglycemia     DM MEDICATIONS USED IN THE PAST: Metformin    Jardiance -- caused his BG readings to "go up"   Pioglitazone  Glimepiride    Devuvia?  Ozempic   Mounjaro       CURRENT DIABETES MEDICATIONS: Metformin 1000 mg BID   Jardiance 25 mg daily   Mounjaro 5 mg weekly   Insulin: N/A   Missed doses: rarely         BLOOD GLUCOSE MONITORING:  he hasn't checked since mid December due to issues with insurance covering test strips   Per oral " recall: 120's when he checked       HYPOGLYCEMIA:  No        MEALS: eating 3 meals per day   He likes salads with SF dressing   Eating fiber   Likes veggies   Snack: likes chocolate   Drinks: regular cokes- 1/2 20 oz   6-12 bottles of water per day        CURRENT EXERCISE:  No      Review of Systems  Review of Systems   Constitutional:  Negative for appetite change, fatigue and unexpected weight change.   HENT:  Negative for trouble swallowing.    Eyes:  Negative for visual disturbance.   Respiratory:  Negative for shortness of breath.    Cardiovascular:  Negative for chest pain.   Gastrointestinal:  Negative for constipation, diarrhea, nausea and vomiting.   Endocrine: Negative for polydipsia, polyphagia and polyuria.   Genitourinary:         No Nocturia    Musculoskeletal:  Positive for arthralgias (right knee pain).   Skin:  Negative for wound.   Neurological:  Positive for numbness and headaches (migraine HA -- get botox injections). Negative for dizziness.       Physical Exam   Physical Exam  Vitals and nursing note reviewed.   Constitutional:       Appearance: He is well-developed. He is obese.   HENT:      Head: Normocephalic and atraumatic.      Right Ear: External ear normal.      Left Ear: External ear normal.      Nose: Nose normal.   Neck:      Thyroid: No thyromegaly.      Trachea: No tracheal deviation.   Cardiovascular:      Rate and Rhythm: Normal rate and regular rhythm.      Heart sounds: No murmur heard.  Pulmonary:      Effort: Pulmonary effort is normal. No respiratory distress.      Breath sounds: Normal breath sounds.   Abdominal:      Palpations: Abdomen is soft.      Tenderness: There is no abdominal tenderness.      Hernia: No hernia is present.   Musculoskeletal:      Cervical back: Normal range of motion and neck supple.   Skin:     General: Skin is warm and dry.      Capillary Refill: Capillary refill takes less than 2 seconds.      Findings: No rash.      Comments: Injection sites  without complications   Neurological:      Mental Status: He is alert and oriented to person, place, and time.      Cranial Nerves: No cranial nerve deficit.   Psychiatric:         Behavior: Behavior normal.         Judgment: Judgment normal.         FOOT EXAMINATION: Appropriate footwear       Lab Results   Component Value Date    TSH 1.678 06/19/2023           Type 2 diabetes mellitus with hyperglycemia, without long-term current use of insulin  Controlled   A1c well controlled and blood sugar readings are reportedly at goal.   Tolerating the Mounjaro well.         -- Medication Changes:   Increase Mounjaro to 7.5 mg weekly for 4-8 weeks and then consider increasing to 10 mg weekly   Let me know about stomach upset     Cut back on the metformin to 1000  mg daily -- can try to work our way off with increasing the mounjaro as tolerated     Continue Jardiance 25 mg daily     Check blood sugars daily or every other day at alternating times   New meter and supplies sent to pharmacy       -- Reviewed goals of therapy are to get the best control we can without hypoglycemia.  -- Advised frequent self blood glucose monitoring.  Patient encouraged to document glucose results and bring them to every clinic visit. Daily at alternating times   -- Hypoglycemia precautions discussed. Instructed on precautions before driving.    -- Call for Bg repeatedly < 70 or > 200.   -- Close adherence to lifestyle changes recommended.   -- Periodic follow ups for eye evaluations, foot care and dental care suggested.          Type 2 diabetes mellitus with diabetic polyneuropathy, without long-term current use of insulin  Optimize BG readings.   See above.     Educated patient to check feet daily for any foreign objects and/or wounds. Discussed with patient the importance of wearing appropriate footwear at all times, not to walk barefoot ever, and to check shoes before putting them on feet. Instructed patient to keep feet dry by regularly  changing shoes and socks and drying feet after baths and exercises. Also, instructed patient to report any new lesions, discolorations, or swelling to a healthcare professional.        Primary hypertension  BP goal is < 140/90.   Controlled   Blood pressure goals discussed with patient      Dyslipidemia, goal LDL below 100  On statin per ADA recommendations  LDL goal < 100. LDL at goal. LFTs WNL. Continue statin.           Chronic venous insufficiency of lower extremity  Optimize BG readings.   See above.       Class 1 obesity due to excess calories with serious comorbidity and body mass index (BMI) of 30.0 to 30.9 in adult  Body mass index is 30.61 kg/m².  Increases insulin resistance.   Discussed DM diet and exercise.         I spent a total of 30 minutes on the day of the visit.This includes face to face time and non-face to face time preparing to see the patient (eg, review of tests), obtaining and/or reviewing separately obtained history, documenting clinical information in the electronic or other health record, independently interpreting results and communicating results to the patient/family/caregiver, or care coordinator.        Follow up in about 6 months (around 7/17/2024).  Follow up with me in 6 months with labs prior       Orders Placed This Encounter   Procedures    Hemoglobin A1C     Standing Status:   Future     Standing Expiration Date:   7/17/2025    Comprehensive Metabolic Panel     Standing Status:   Future     Standing Expiration Date:   7/17/2025    CBC Auto Differential     Standing Status:   Future     Standing Expiration Date:   7/17/2025    Lipid Panel     Standing Status:   Future     Standing Expiration Date:   7/17/2025    TSH     Standing Status:   Future     Standing Expiration Date:   7/17/2025    Microalbumin/Creatinine Ratio, Urine     Standing Status:   Future     Standing Expiration Date:   7/17/2025     Order Specific Question:   Specimen Source     Answer:   Urine        Recommendations were discussed with the patient in detail  The patient verbalized understanding and agrees with the plan outlined as above.     This note was partly generated with [employer] voice recognition software. I apologize for any possible typographical errors.

## 2024-01-17 NOTE — ASSESSMENT & PLAN NOTE
Controlled   A1c well controlled and blood sugar readings are reportedly at goal.   Tolerating the Mounjaro well.         -- Medication Changes:   Increase Mounjaro to 7.5 mg weekly for 4-8 weeks and then consider increasing to 10 mg weekly   Let me know about stomach upset     Cut back on the metformin to 1000  mg daily -- can try to work our way off with increasing the mounjaro as tolerated     Continue Jardiance 25 mg daily     Check blood sugars daily or every other day at alternating times   New meter and supplies sent to pharmacy       -- Reviewed goals of therapy are to get the best control we can without hypoglycemia.  -- Advised frequent self blood glucose monitoring.  Patient encouraged to document glucose results and bring them to every clinic visit. Daily at alternating times   -- Hypoglycemia precautions discussed. Instructed on precautions before driving.    -- Call for Bg repeatedly < 70 or > 200.   -- Close adherence to lifestyle changes recommended.   -- Periodic follow ups for eye evaluations, foot care and dental care suggested.

## 2024-01-17 NOTE — PATIENT INSTRUCTIONS
Increase Mounjaro to 7.5 mg weekly for 4-8 weeks and then consider increasing to 10 mg weekly   Let me know about stomach upset     Cut back on the metformin to 1000  mg daily -- can try to work our way off with increasing the mounjaro as tolerated     Continue Jardiance 25 mg daily     Check blood sugars daily or every other day at alternating times   New meter and supplies sent to pharmacy

## 2024-01-17 NOTE — ASSESSMENT & PLAN NOTE
Body mass index is 30.61 kg/m².  Increases insulin resistance.   Discussed DM diet and exercise.

## 2024-02-12 DIAGNOSIS — E11.65 TYPE 2 DIABETES MELLITUS WITH HYPERGLYCEMIA, WITHOUT LONG-TERM CURRENT USE OF INSULIN: ICD-10-CM

## 2024-02-12 RX ORDER — EMPAGLIFLOZIN 25 MG/1
25 TABLET, FILM COATED ORAL
Qty: 30 TABLET | Refills: 6 | Status: SHIPPED | OUTPATIENT
Start: 2024-02-12 | End: 2024-04-09 | Stop reason: SDUPTHER

## 2024-02-14 NOTE — PROGRESS NOTES
Subjective:      Reynaldo Chong is a 58 y.o. male who returns today regarding his LUTS.    History of BPH; currently on Flomax daily.  Presents today with complaint of increased frequency and urgency over the past 2 months.  He denies any new medications.  Denies any changes in diet/lifestyle.  Drinks 2-3 cups of coffee per day and about 6-8 bottles of water per day.  He denies dysuria, gross hematuria, flank pain.    History of ED; denies significant improvement with daily dosing of Cialis 20 mg.  He expresses interest in TriMix injections.  He would like to defer at this time until urinary symptoms improve.     HPI: Benign Prostatic Hypertrophy  Patient complains of lower urinary tract symptoms. He reports nocturia 3x per night, daytime frequency, intermittency, straining, and urgency. He denies weak stream. Patient states symptoms are of severe severity. Onset of symptoms was several weeks ago and was unknown in onset. His AUA Symptom Score is, 19/5 . He has no personal history and no family history of prostate cancer. He denies flank pain, gross hematuria, kidney stones, and recurrent UTI.    He reports having UroLift done by Dr. Reyes in [place] several years ago.  He also reports history of circumcision revision.  +DMII; +HSV; +migraines; possible sleep apnea- no previous workup     Also reports erectile dysfunction; present for many years.  No previous treatment.  The following portions of the patient's history were reviewed and updated as appropriate: allergies, current medications, past family history, past medical history, past social history, past surgical history and problem list.    Review of Systems  A comprehensive multipoint review of systems was negative except as otherwise stated in the HPI.     Objective:   Vitals: /82 (BP Location: Left arm, Patient Position: Sitting, BP Method: Medium (Automatic))   Pulse 62   Wt 91.3 kg (201 lb 6.2 oz)   BMI 28.09 kg/m²     Physical Exam   General:  alert and oriented, no acute distress  Respiratory: Symmetric expansion, non-labored breathing  Cardiovascular: regular rate and rhythm, no peripheral edema  Abdomen: soft, non distended  Skin: normal coloration and turgor, no rashes, no suspicious skin lesions noted  Neuro: no gross deficits  Psych: normal judgment and insight, normal mood/affect, and non-anxious    Lab Review   Urinalysis demonstrates no ua   Lab Results   Component Value Date    WBC 11.25 08/24/2023    HGB 15.4 08/24/2023    HCT 46.7 08/24/2023    MCV 94 08/24/2023     08/24/2023     Lab Results   Component Value Date    CREATININE 1.0 01/10/2024    BUN 18 01/10/2024     Lab Results   Component Value Date    PSA 0.38 05/31/2017    PSADIAG 0.48 03/08/2023            Bladder Scan Random: 0 cc        Assessment and Plan:   1. Urgency incontinence  - Urine culture; Future  - solifenacin (VESICARE) 10 MG tablet; Take 1 tablet (10 mg total) by mouth once daily.  Dispense: 30 tablet; Refill: 11    2. Benign prostatic hyperplasia with urinary frequency  --continue Flomax     --will notify with results  --return to clinic in 1 month       This note is dictated on M*Modal word recognition program.  There are word recognition mistakes that are occasionally missed on review.

## 2024-02-15 ENCOUNTER — OFFICE VISIT (OUTPATIENT)
Dept: UROLOGY | Facility: CLINIC | Age: 59
End: 2024-02-15
Payer: MEDICAID

## 2024-02-15 VITALS
DIASTOLIC BLOOD PRESSURE: 82 MMHG | BODY MASS INDEX: 28.09 KG/M2 | SYSTOLIC BLOOD PRESSURE: 129 MMHG | WEIGHT: 201.38 LBS | HEART RATE: 62 BPM

## 2024-02-15 DIAGNOSIS — R35.0 BENIGN PROSTATIC HYPERPLASIA WITH URINARY FREQUENCY: ICD-10-CM

## 2024-02-15 DIAGNOSIS — N39.41 URGENCY INCONTINENCE: Primary | ICD-10-CM

## 2024-02-15 DIAGNOSIS — N40.1 BENIGN PROSTATIC HYPERPLASIA WITH URINARY FREQUENCY: ICD-10-CM

## 2024-02-15 LAB — POC RESIDUAL URINE VOLUME: 0 ML (ref 0–100)

## 2024-02-15 PROCEDURE — 3079F DIAST BP 80-89 MM HG: CPT | Mod: CPTII,,, | Performed by: NURSE PRACTITIONER

## 2024-02-15 PROCEDURE — 99214 OFFICE O/P EST MOD 30 MIN: CPT | Mod: PBBFAC,PN | Performed by: NURSE PRACTITIONER

## 2024-02-15 PROCEDURE — 51798 US URINE CAPACITY MEASURE: CPT | Mod: PBBFAC,PN | Performed by: NURSE PRACTITIONER

## 2024-02-15 PROCEDURE — 99999 PR PBB SHADOW E&M-EST. PATIENT-LVL IV: CPT | Mod: PBBFAC,,, | Performed by: NURSE PRACTITIONER

## 2024-02-15 PROCEDURE — 3074F SYST BP LT 130 MM HG: CPT | Mod: CPTII,,, | Performed by: NURSE PRACTITIONER

## 2024-02-15 PROCEDURE — 99214 OFFICE O/P EST MOD 30 MIN: CPT | Mod: S$PBB,,, | Performed by: NURSE PRACTITIONER

## 2024-02-15 PROCEDURE — 3008F BODY MASS INDEX DOCD: CPT | Mod: CPTII,,, | Performed by: NURSE PRACTITIONER

## 2024-02-15 PROCEDURE — 1159F MED LIST DOCD IN RCRD: CPT | Mod: CPTII,,, | Performed by: NURSE PRACTITIONER

## 2024-02-15 PROCEDURE — 3044F HG A1C LEVEL LT 7.0%: CPT | Mod: CPTII,,, | Performed by: NURSE PRACTITIONER

## 2024-02-15 PROCEDURE — 1160F RVW MEDS BY RX/DR IN RCRD: CPT | Mod: CPTII,,, | Performed by: NURSE PRACTITIONER

## 2024-02-15 PROCEDURE — 99999PBSHW POCT BLADDER SCAN: Mod: PBBFAC,,,

## 2024-02-15 RX ORDER — TAMSULOSIN HYDROCHLORIDE 0.4 MG/1
0.4 CAPSULE ORAL DAILY
Qty: 30 CAPSULE | Refills: 11 | Status: SHIPPED | OUTPATIENT
Start: 2024-02-15

## 2024-02-15 RX ORDER — SOLIFENACIN SUCCINATE 10 MG/1
10 TABLET, FILM COATED ORAL DAILY
Qty: 30 TABLET | Refills: 11 | Status: SHIPPED | OUTPATIENT
Start: 2024-02-15 | End: 2024-03-20

## 2024-03-01 ENCOUNTER — PATIENT MESSAGE (OUTPATIENT)
Dept: DIABETES | Facility: CLINIC | Age: 59
End: 2024-03-01
Payer: MEDICAID

## 2024-03-01 DIAGNOSIS — E11.65 TYPE 2 DIABETES MELLITUS WITH HYPERGLYCEMIA, WITHOUT LONG-TERM CURRENT USE OF INSULIN: ICD-10-CM

## 2024-03-01 RX ORDER — TIRZEPATIDE 7.5 MG/.5ML
INJECTION, SOLUTION SUBCUTANEOUS
Refills: 1 | OUTPATIENT
Start: 2024-03-01

## 2024-03-18 NOTE — PROGRESS NOTES
Subjective:      Reynaldo Chong is a 58 y.o. male who returns today regarding his LUTS.    History of BPH; currently on Flomax daily.   Recently reported bothersome urgency frequency and nocturia.  Trial of VESIcare was prescribed.  He reports resolution of frequency and urgency and is only having nocturia 1 time per night as opposed to 5 times per night previously.  He denies bothersome side effects and would like to continue this medication      History of ED; denies significant improvement with daily dosing of Cialis 20 mg.  He expresses interest in TriMix injections.  Would like to try daily cialis for BPH and ED before trying Trimix injections     S/p UroLift by Dr. Reyes in [place] several years ago.  He also reports history of circumcision revision.  +DMII; +HSV; +migraines; possible sleep apnea- no previous workup     Also reports erectile dysfunction; present for many years.  No previous treatment.  The following portions of the patient's history were reviewed and updated as appropriate: allergies, current medications, past family history, past medical history, past social history, past surgical history and problem list.    Review of Systems  A comprehensive multipoint review of systems was negative except as otherwise stated in the HPI.     Objective:   Vitals: /72 (BP Location: Right arm, Patient Position: Sitting, BP Method: Medium (Automatic))   Pulse 66   Wt 89.1 kg (196 lb 6.9 oz)   BMI 27.40 kg/m²     Physical Exam   General: alert and oriented, no acute distress  Respiratory: Symmetric expansion, non-labored breathing  Cardiovascular: regular rate and rhythm, no peripheral edema  Abdomen: soft, non distended  Skin: normal coloration and turgor, no rashes, no suspicious skin lesions noted  Neuro: no gross deficits  Psych: normal judgment and insight, normal mood/affect, and non-anxious    Lab Review   Urinalysis demonstrates no ua   Lab Results   Component Value Date    WBC 11.25  08/24/2023    HGB 15.4 08/24/2023    HCT 46.7 08/24/2023    MCV 94 08/24/2023     08/24/2023     Lab Results   Component Value Date    CREATININE 1.0 01/10/2024    BUN 18 01/10/2024     Lab Results   Component Value Date    PSA 0.38 05/31/2017    PSADIAG 0.48 03/08/2023         Assessment and Plan:   1. Urgency incontinence   --continue vesicare QD    2. Erectile dysfunction due to arterial insufficiency   3. Benign prostatic hyperplasia with urinary frequency  --continue Flomax   --trial Cialis 5 mg QD    --RTC in 1 year; sooner for new or worsening symptoms     This note is dictated on M*Modal word recognition program.  There are word recognition mistakes that are occasionally missed on review.

## 2024-03-20 ENCOUNTER — OFFICE VISIT (OUTPATIENT)
Dept: UROLOGY | Facility: CLINIC | Age: 59
End: 2024-03-20
Payer: MEDICAID

## 2024-03-20 VITALS
WEIGHT: 196.44 LBS | BODY MASS INDEX: 27.4 KG/M2 | DIASTOLIC BLOOD PRESSURE: 72 MMHG | HEART RATE: 66 BPM | SYSTOLIC BLOOD PRESSURE: 121 MMHG

## 2024-03-20 DIAGNOSIS — N40.1 BENIGN PROSTATIC HYPERPLASIA WITH URINARY FREQUENCY: ICD-10-CM

## 2024-03-20 DIAGNOSIS — N39.41 URGENCY INCONTINENCE: Primary | ICD-10-CM

## 2024-03-20 DIAGNOSIS — R35.0 BENIGN PROSTATIC HYPERPLASIA WITH URINARY FREQUENCY: ICD-10-CM

## 2024-03-20 DIAGNOSIS — N52.01 ERECTILE DYSFUNCTION DUE TO ARTERIAL INSUFFICIENCY: ICD-10-CM

## 2024-03-20 PROCEDURE — 99214 OFFICE O/P EST MOD 30 MIN: CPT | Mod: PBBFAC,PN | Performed by: NURSE PRACTITIONER

## 2024-03-20 PROCEDURE — 1159F MED LIST DOCD IN RCRD: CPT | Mod: CPTII,,, | Performed by: NURSE PRACTITIONER

## 2024-03-20 PROCEDURE — 1160F RVW MEDS BY RX/DR IN RCRD: CPT | Mod: CPTII,,, | Performed by: NURSE PRACTITIONER

## 2024-03-20 PROCEDURE — 3008F BODY MASS INDEX DOCD: CPT | Mod: CPTII,,, | Performed by: NURSE PRACTITIONER

## 2024-03-20 PROCEDURE — 3044F HG A1C LEVEL LT 7.0%: CPT | Mod: CPTII,,, | Performed by: NURSE PRACTITIONER

## 2024-03-20 PROCEDURE — 3078F DIAST BP <80 MM HG: CPT | Mod: CPTII,,, | Performed by: NURSE PRACTITIONER

## 2024-03-20 PROCEDURE — 99999 PR PBB SHADOW E&M-EST. PATIENT-LVL IV: CPT | Mod: PBBFAC,,, | Performed by: NURSE PRACTITIONER

## 2024-03-20 PROCEDURE — 3074F SYST BP LT 130 MM HG: CPT | Mod: CPTII,,, | Performed by: NURSE PRACTITIONER

## 2024-03-20 PROCEDURE — 99214 OFFICE O/P EST MOD 30 MIN: CPT | Mod: S$PBB,,, | Performed by: NURSE PRACTITIONER

## 2024-03-20 RX ORDER — TADALAFIL 5 MG/1
5 TABLET ORAL DAILY PRN
Qty: 90 TABLET | Refills: 3 | Status: SHIPPED | OUTPATIENT
Start: 2024-03-20 | End: 2025-03-20

## 2024-03-20 RX ORDER — SOLIFENACIN SUCCINATE 10 MG/1
10 TABLET, FILM COATED ORAL DAILY
Qty: 90 TABLET | Refills: 3 | Status: SHIPPED | OUTPATIENT
Start: 2024-03-20 | End: 2025-03-20

## 2024-04-09 ENCOUNTER — TELEPHONE (OUTPATIENT)
Dept: UROLOGY | Facility: CLINIC | Age: 59
End: 2024-04-09
Payer: MEDICAID

## 2024-04-09 ENCOUNTER — TELEPHONE (OUTPATIENT)
Dept: CARDIOLOGY | Facility: CLINIC | Age: 59
End: 2024-04-09
Payer: MEDICAID

## 2024-04-09 DIAGNOSIS — E11.65 TYPE 2 DIABETES MELLITUS WITH HYPERGLYCEMIA, WITHOUT LONG-TERM CURRENT USE OF INSULIN: ICD-10-CM

## 2024-04-09 NOTE — TELEPHONE ENCOUNTER
Spoke with patient, informed that Dr. Lancaster is unable to refill prescription since he has not seen him.  Patient verbalized understanding.  He asked to disregard request because he really wants to stop the medication and will discuss it with his new PCP.  Scheduled appointment with Dr. Lancaster to establish care.

## 2024-04-09 NOTE — TELEPHONE ENCOUNTER
Insurance said that patient needed to do his Flomax script for a 90 day supply to be paid for. Pharmacy just wanted to make sure it was ok to give him his script in 90 every 3 months.

## 2024-04-09 NOTE — TELEPHONE ENCOUNTER
----- Message from Anaya Crane sent at 4/9/2024 10:56 AM CDT -----  Regarding: med refills  Contact: [facility]    atorvastatin (LIPITOR) 40 MG tablet req refills - req 90 day supply    [facility] [place] - [address] [place] [address]  Phone: [phone] Fax: [phone]    Patient can be contacted @# [phone]

## 2024-04-09 NOTE — TELEPHONE ENCOUNTER
----- Message from Anaya Crane sent at 2024 11:04 AM CDT -----  Regardin day refills  Contact: [facility]  tamsulosin (FLOMAX) 0.4 mg Cap M req refills - req 90 day supply    [facility] [place] - [address] [place] [address]  Phone: [phone] Fax: [phone]    Patient can be contacted @#  [phone]

## 2024-06-07 ENCOUNTER — TELEPHONE (OUTPATIENT)
Dept: UROLOGY | Facility: CLINIC | Age: 59
End: 2024-06-07
Payer: MEDICAID

## 2024-06-07 DIAGNOSIS — R30.0 DYSURIA: Primary | ICD-10-CM

## 2024-06-07 NOTE — TELEPHONE ENCOUNTER
Placed urine culture order for patient.  Advised patient if symptoms were to get worse he should go to a urgent care.  Answered all of patient questions.  Patient verbalized understanding.      EDISON Noriega

## 2024-06-07 NOTE — TELEPHONE ENCOUNTER
Spoke with patient.  Placed urine culture order in for patient.  Advised patient to drop off urine sample at his earliest convenience.  Patient stated he would drop off sample Monday morning.  Informed patient that he should hear something from office on Monday.  Patient verbalized understanding.    EDISON Noriega

## 2024-06-07 NOTE — TELEPHONE ENCOUNTER
----- Message from Hermelindo Peres sent at 6/7/2024  9:07 AM CDT -----  Contact: [phone]  Pt scheduled an appt but was trying to see if he should do a urine sample before the appt. Please call back to further assist.

## 2024-06-10 ENCOUNTER — OFFICE VISIT (OUTPATIENT)
Dept: UROLOGY | Facility: CLINIC | Age: 59
End: 2024-06-10
Payer: MEDICAID

## 2024-06-10 VITALS
BODY MASS INDEX: 26.73 KG/M2 | DIASTOLIC BLOOD PRESSURE: 74 MMHG | WEIGHT: 190.94 LBS | HEIGHT: 71 IN | SYSTOLIC BLOOD PRESSURE: 109 MMHG | HEART RATE: 71 BPM

## 2024-06-10 DIAGNOSIS — R10.9 RIGHT FLANK PAIN: ICD-10-CM

## 2024-06-10 DIAGNOSIS — N40.1 BENIGN PROSTATIC HYPERPLASIA WITH URINARY FREQUENCY: Primary | ICD-10-CM

## 2024-06-10 DIAGNOSIS — R35.0 BENIGN PROSTATIC HYPERPLASIA WITH URINARY FREQUENCY: Primary | ICD-10-CM

## 2024-06-10 DIAGNOSIS — R30.0 DYSURIA: ICD-10-CM

## 2024-06-10 PROCEDURE — 1159F MED LIST DOCD IN RCRD: CPT | Mod: CPTII,,, | Performed by: NURSE PRACTITIONER

## 2024-06-10 PROCEDURE — 3078F DIAST BP <80 MM HG: CPT | Mod: CPTII,,, | Performed by: NURSE PRACTITIONER

## 2024-06-10 PROCEDURE — 3074F SYST BP LT 130 MM HG: CPT | Mod: CPTII,,, | Performed by: NURSE PRACTITIONER

## 2024-06-10 PROCEDURE — 99215 OFFICE O/P EST HI 40 MIN: CPT | Mod: PBBFAC,PN | Performed by: NURSE PRACTITIONER

## 2024-06-10 PROCEDURE — 99214 OFFICE O/P EST MOD 30 MIN: CPT | Mod: S$PBB,,, | Performed by: NURSE PRACTITIONER

## 2024-06-10 PROCEDURE — 3008F BODY MASS INDEX DOCD: CPT | Mod: CPTII,,, | Performed by: NURSE PRACTITIONER

## 2024-06-10 PROCEDURE — 99999 PR PBB SHADOW E&M-EST. PATIENT-LVL V: CPT | Mod: PBBFAC,,, | Performed by: NURSE PRACTITIONER

## 2024-06-10 PROCEDURE — 3044F HG A1C LEVEL LT 7.0%: CPT | Mod: CPTII,,, | Performed by: NURSE PRACTITIONER

## 2024-06-10 PROCEDURE — 1160F RVW MEDS BY RX/DR IN RCRD: CPT | Mod: CPTII,,, | Performed by: NURSE PRACTITIONER

## 2024-06-10 RX ORDER — UBROGEPANT 100 MG/1
100 TABLET ORAL
COMMUNITY

## 2024-06-10 RX ORDER — PHENAZOPYRIDINE HYDROCHLORIDE 100 MG/1
100 TABLET, FILM COATED ORAL 3 TIMES DAILY PRN
Qty: 30 TABLET | Refills: 5 | Status: SHIPPED | OUTPATIENT
Start: 2024-06-10

## 2024-06-10 RX ORDER — PHENAZOPYRIDINE HYDROCHLORIDE 100 MG/1
100 TABLET, FILM COATED ORAL 3 TIMES DAILY PRN
Qty: 30 TABLET | Refills: 5 | Status: SHIPPED | OUTPATIENT
Start: 2024-06-10 | End: 2024-06-10

## 2024-06-10 NOTE — PROGRESS NOTES
"Subjective:      Reynaldo Chong is a 59 y.o. male who returns today regarding his dysuria.    Reports dysuria, lower right quadrant pain, cloudy urine for several days. UC negative   No GH, f/c/n/v.   Did have several episodes of diaphoresis over weekend     The following portions of the patient's history were reviewed and updated as appropriate: allergies, current medications, past family history, past medical history, past social history, past surgical history and problem list.    Review of Systems  A comprehensive multipoint review of systems was negative except as otherwise stated in the HPI.     Objective:   Vitals: /74 (BP Location: Right arm, Patient Position: Sitting, BP Method: Small (Automatic))   Pulse 71   Ht 5' 11" (1.803 m)   Wt 86.6 kg (190 lb 14.7 oz)   BMI 26.63 kg/m²     Physical Exam   General: alert and oriented, no acute distress  Respiratory: Symmetric expansion, non-labored breathing  Cardiovascular: regular rate and rhythm, no peripheral edema  Abdomen: soft, non distended  Skin: normal coloration and turgor, no rashes, no suspicious skin lesions noted  Neuro: no gross deficits  Psych: normal judgment and insight, normal mood/affect, and non-anxious  No CVAT       Lab Review   Urinalysis demonstrates no ua   Lab Results   Component Value Date    WBC 11.25 08/24/2023    HGB 15.4 08/24/2023    HCT 46.7 08/24/2023    MCV 94 08/24/2023     08/24/2023     Lab Results   Component Value Date    CREATININE 1.0 01/10/2024    BUN 18 01/10/2024     Lab Results   Component Value Date    PSA 0.38 05/31/2017    PSADIAG 0.48 03/08/2023         Assessment and Plan:   1. Benign prostatic hyperplasia with urinary frequency  - Prostate Specific Antigen, Diagnostic; Future    2. Dysuria  --UC negative   --increase water intake - CT Renal Stone Study ABD Pelvis WO; Future  - phenazopyridine (PYRIDIUM) 100 MG tablet; Take 1 tablet (100 mg total) by mouth 3 (three) times daily as needed for Pain.  " Dispense: 30 tablet; Refill: 5    3. Right flank pain  - CT Renal Stone Study ABD Pelvis WO; Future     --will notify with results     This note is dictated on M*Modal word recognition program.  There are word recognition mistakes that are occasionally missed on review.

## 2024-06-26 ENCOUNTER — OFFICE VISIT (OUTPATIENT)
Dept: PODIATRY | Facility: CLINIC | Age: 59
End: 2024-06-26
Payer: MEDICAID

## 2024-06-26 VITALS
HEART RATE: 65 BPM | HEIGHT: 71 IN | BODY MASS INDEX: 26.37 KG/M2 | SYSTOLIC BLOOD PRESSURE: 109 MMHG | WEIGHT: 188.38 LBS | DIASTOLIC BLOOD PRESSURE: 78 MMHG

## 2024-06-26 DIAGNOSIS — E11.42 TYPE 2 DIABETES MELLITUS WITH DIABETIC POLYNEUROPATHY, WITHOUT LONG-TERM CURRENT USE OF INSULIN: Primary | ICD-10-CM

## 2024-06-26 DIAGNOSIS — I87.2 CHRONIC VENOUS INSUFFICIENCY OF LOWER EXTREMITY: ICD-10-CM

## 2024-06-26 DIAGNOSIS — L60.0 INGROWN LEFT BIG TOENAIL: ICD-10-CM

## 2024-06-26 DIAGNOSIS — M79.675 PAIN OF LEFT GREAT TOE: ICD-10-CM

## 2024-06-26 DIAGNOSIS — Q84.5 ENLARGED AND HYPERTROPHIC NAILS: ICD-10-CM

## 2024-06-26 PROCEDURE — 99203 OFFICE O/P NEW LOW 30 MIN: CPT | Mod: S$PBB,,, | Performed by: PODIATRIST

## 2024-06-26 PROCEDURE — 3078F DIAST BP <80 MM HG: CPT | Mod: CPTII,,, | Performed by: PODIATRIST

## 2024-06-26 PROCEDURE — 1159F MED LIST DOCD IN RCRD: CPT | Mod: CPTII,,, | Performed by: PODIATRIST

## 2024-06-26 PROCEDURE — 3044F HG A1C LEVEL LT 7.0%: CPT | Mod: CPTII,,, | Performed by: PODIATRIST

## 2024-06-26 PROCEDURE — 99999 PR PBB SHADOW E&M-EST. PATIENT-LVL V: CPT | Mod: PBBFAC,,, | Performed by: PODIATRIST

## 2024-06-26 PROCEDURE — 99215 OFFICE O/P EST HI 40 MIN: CPT | Mod: PBBFAC,PN | Performed by: PODIATRIST

## 2024-06-26 PROCEDURE — 3008F BODY MASS INDEX DOCD: CPT | Mod: CPTII,,, | Performed by: PODIATRIST

## 2024-06-26 PROCEDURE — 3074F SYST BP LT 130 MM HG: CPT | Mod: CPTII,,, | Performed by: PODIATRIST

## 2024-06-26 RX ORDER — ATORVASTATIN CALCIUM 40 MG/1
40 TABLET, FILM COATED ORAL
COMMUNITY
Start: 2024-04-14 | End: 2024-07-13

## 2024-06-26 RX ORDER — ACETAMINOPHEN 500 MG
1 TABLET ORAL DAILY
COMMUNITY

## 2024-06-26 RX ORDER — LANCETS 30 GAUGE
EACH MISCELLANEOUS
COMMUNITY
Start: 2024-06-11

## 2024-06-26 RX ORDER — LANCETS
1 EACH MISCELLANEOUS
COMMUNITY
Start: 2024-06-12 | End: 2024-09-10

## 2024-06-26 RX ORDER — HYDROXYZINE HYDROCHLORIDE 25 MG/1
TABLET, FILM COATED ORAL
COMMUNITY
Start: 2024-06-12

## 2024-06-26 NOTE — PROGRESS NOTES
Subjective:      Patient ID: Reynaldo Chong is a 59 y.o. male.    Chief Complaint: Diabetic Foot Exam and Nail Care    Reynaldo is a 59 y.o. male who presents new to the clinic for evaluation & tx of high risk feet.  Was seeing another provider but did not like he also moved back to this [place] 3/2022 (was in [place] 2017-). The patient's cc to establish care & long, thick toenails.  Was going for pedicures monthly but it has been 5 months so having some discomfort especially L medial hallux nail border.    PCP: Nichole Denise MD 6/10/24 - [facility] primary care    Quit smoking 2019  Dx DM 2004  Past Medical History:   Diagnosis Date    Anxiety     BPH (benign prostatic hyperplasia)     Depression     Diabetes mellitus, type 2     ED (erectile dysfunction)     Enlarged prostate     Hypertension     Migraines     Mixed hyperlipidemia     Peptic ulcer     Personal history of colonic polyps     Personal history of colonic polyps 10/06/2023     Patient Active Problem List   Diagnosis    Screening for prostate cancer    Rash    Encounter to establish care with new doctor    Dyslipidemia, goal LDL below 100    Type 2 diabetes mellitus with hyperglycemia, without long-term current use of insulin    Chronic migraine without aura without status migrainosus, not intractable    History of narcotic addiction    Cocaine dependence in remission    Chronic pain due to trauma    History of loop recorder    Primary hypertension    Chronic venous insufficiency of lower extremity    Type 2 diabetes mellitus with diabetic polyneuropathy, without long-term current use of insulin    Class 1 obesity due to excess calories with serious comorbidity and body mass index (BMI) of 30.0 to 30.9 in adult    History of colon polyps      Hemoglobin A1C   Date Value Ref Range Status   01/10/2024 6.5 (H) 4.0 - 5.6 % Final     Comment:     ADA Screening Guidelines:  5.7-6.4%  Consistent with prediabetes  >or=6.5%  Consistent with diabetes    High levels of  fetal hemoglobin interfere with the HbA1C  assay. Heterozygous hemoglobin variants (HbS, HgC, etc)do  not significantly interfere with this assay.   However, presence of multiple variants may affect accuracy.     09/07/2023 6.5 (H) 4.0 - 5.6 % Final     Comment:     ADA Screening Guidelines:  5.7-6.4%  Consistent with prediabetes  >or=6.5%  Consistent with diabetes    High levels of fetal hemoglobin interfere with the HbA1C  assay. Heterozygous hemoglobin variants (HbS, HgC, etc)do  not significantly interfere with this assay.   However, presence of multiple variants may affect accuracy.     06/19/2023 6.6 (H) 4.0 - 5.6 % Final     Comment:     ADA Screening Guidelines:  5.7-6.4%  Consistent with prediabetes  >or=6.5%  Consistent with diabetes    High levels of fetal hemoglobin interfere with the HbA1C  assay. Heterozygous hemoglobin variants (HbS, HgC, etc)do  not significantly interfere with this assay.   However, presence of multiple variants may affect accuracy.       Objective:      Review of Systems   Constitutional: Positive for weight loss ([place] - 70lb weight loss in past few months). Negative for malaise/fatigue.   Cardiovascular:  Negative for leg swelling.   Skin:  Positive for color change. Negative for suspicious lesions.   Musculoskeletal:  Positive for joint pain. Negative for falls and myalgias.   Neurological:  Positive for headaches (Migraine-gets Botox). Negative for numbness and paresthesias.   Psychiatric/Behavioral:  The patient is nervous/anxious.      Physical Exam  Vitals reviewed.   Constitutional:       General: He is not in acute distress.     Appearance: He is well-developed and overweight.   Cardiovascular:      Pulses: Normal pulses.           Dorsalis pedis pulses are 2+ on the right side and 2+ on the left side.      Comments: Venous insufficiency edema B/L.  Musculoskeletal:         General: No swelling or tenderness.      Right lower leg: No edema.      Left lower leg: No  "edema.   Feet:      Comments: Toenails  B/L are hypertrophic & wide, discolored.  Tender to distal nail plate pressure L medial hallux nail border, w/out periungual skin abnormality noted.     Skin:     General: Skin is warm and dry.      Capillary Refill: Capillary refill takes less than 2 seconds.      Findings: No bruising or erythema.   Neurological:      Mental Status: He is alert and oriented to person, place, and time.      Motor: Motor function is intact. No weakness or abnormal muscle tone.      Gait: Gait is intact. Gait normal.   Psychiatric:         Mood and Affect: Mood and affect normal.         Behavior: Behavior is hyperactive. Behavior is cooperative.         Assessment:      Encounter Diagnoses   Name Primary?    Type 2 diabetes mellitus with diabetic polyneuropathy, without long-term current use of insulin Yes    Pain of left great toe     Ingrown left big toenail     Enlarged and hypertrophic nails     Chronic venous insufficiency of lower extremity        Problem List Items Addressed This Visit          Cardiac/Vascular    Chronic venous insufficiency of lower extremity    Overview     Will be evaluated by Dr. Becker- may require intervention.              Endocrine    Type 2 diabetes mellitus with diabetic polyneuropathy, without long-term current use of insulin - Primary    Relevant Orders    Nail debridement     Other Visit Diagnoses       Pain of left great toe        Ingrown left big toenail        Relevant Orders    Nail debridement    Enlarged and hypertrophic nails        Relevant Orders    Nail debridement           Plan:       Reynaldo Parisi" was seen today for diabetic foot exam and nail care.    Diagnoses and all orders for this visit:    Type 2 diabetes mellitus with diabetic polyneuropathy, without long-term current use of insulin  -     Nail debridement    Pain of left great toe    Ingrown left big toenail  -     Nail debridement    Enlarged and hypertrophic nails  -     Nail " debridement    Chronic venous insufficiency of lower extremity    I counseled the patient on his conditions, their implications & medical mgmt.    - Shoe inspection. Diabetic Foot Education. Patient reminded of the importance of good blood sugar control to help prevent podiatric complications of diabetes. Patient instructed on proper foot hygeine. We discussed wearing proper shoe gear, daily foot inspections, never walking w/out protective shoe gear, annual DM foot exam, sooner p.r.n.      - W/ patient's permission, B/L nails were aggressively reduced & debrided x 10 to their soft tissue attachment mechanically, removing all offending nail & debris.   Utilizing sterile toenail nippers, I debrided the offending nail border approximately 3 mm from its edge & carried the nail plate incision down @ an angle in order to wedge out the offending cryptotic portion of the nail plate in toto. The area was cleansed w/ alcohol.   Patient tolerated the procedure well & related significant relief.  Instructions on appropriate self debridement of nails w/ correct instrumentation provided.        A total of 30 mins.was spent on chart review, patient visit & documentation.

## 2024-07-05 NOTE — PROCEDURES
Nail debridement    Date/Time: 6/26/2024 3:00 PM    Performed by: Kalyn Arteaga DPM  Authorized by: Kalyn Arteaga DPM    Consent Done?:  Yes (Verbal)    Nail Care Type:  Trim  Location(s): All  (Left 1st Toe, Left 3rd Toe, Left 2nd Toe, Left 4th Toe, Left 5th Toe, Right 1st Toe, Right 2nd Toe, Right 3rd Toe, Right 4th Toe and Right 5th Toe)  Patient tolerance:  Patient tolerated the procedure well with no immediate complications

## 2024-07-18 ENCOUNTER — OFFICE VISIT (OUTPATIENT)
Dept: CARDIOLOGY | Facility: CLINIC | Age: 59
End: 2024-07-18
Payer: MEDICAID

## 2024-07-18 VITALS
HEART RATE: 65 BPM | WEIGHT: 187.63 LBS | BODY MASS INDEX: 26.17 KG/M2 | OXYGEN SATURATION: 95 % | DIASTOLIC BLOOD PRESSURE: 69 MMHG | SYSTOLIC BLOOD PRESSURE: 107 MMHG

## 2024-07-18 DIAGNOSIS — I10 PRIMARY HYPERTENSION: ICD-10-CM

## 2024-07-18 DIAGNOSIS — I25.10 CORONARY ARTERY CALCIFICATION: ICD-10-CM

## 2024-07-18 DIAGNOSIS — E78.5 HYPERLIPIDEMIA, UNSPECIFIED HYPERLIPIDEMIA TYPE: ICD-10-CM

## 2024-07-18 DIAGNOSIS — I25.84 CORONARY ARTERY CALCIFICATION: ICD-10-CM

## 2024-07-18 DIAGNOSIS — I87.2 CHRONIC VENOUS INSUFFICIENCY OF LOWER EXTREMITY: Primary | ICD-10-CM

## 2024-07-18 PROCEDURE — 1159F MED LIST DOCD IN RCRD: CPT | Mod: CPTII,,, | Performed by: INTERNAL MEDICINE

## 2024-07-18 PROCEDURE — 3008F BODY MASS INDEX DOCD: CPT | Mod: CPTII,,, | Performed by: INTERNAL MEDICINE

## 2024-07-18 PROCEDURE — 3044F HG A1C LEVEL LT 7.0%: CPT | Mod: CPTII,,, | Performed by: INTERNAL MEDICINE

## 2024-07-18 PROCEDURE — 3078F DIAST BP <80 MM HG: CPT | Mod: CPTII,,, | Performed by: INTERNAL MEDICINE

## 2024-07-18 PROCEDURE — 99214 OFFICE O/P EST MOD 30 MIN: CPT | Mod: PBBFAC,PN | Performed by: INTERNAL MEDICINE

## 2024-07-18 PROCEDURE — 99999 PR PBB SHADOW E&M-EST. PATIENT-LVL IV: CPT | Mod: PBBFAC,,, | Performed by: INTERNAL MEDICINE

## 2024-07-18 PROCEDURE — 3074F SYST BP LT 130 MM HG: CPT | Mod: CPTII,,, | Performed by: INTERNAL MEDICINE

## 2024-07-18 PROCEDURE — 99204 OFFICE O/P NEW MOD 45 MIN: CPT | Mod: S$PBB,,, | Performed by: INTERNAL MEDICINE

## 2024-07-18 RX ORDER — HYDROCORTISONE ACETATE 25 MG/1
25 SUPPOSITORY RECTAL 2 TIMES DAILY PRN
COMMUNITY
Start: 2024-07-11 | End: 2024-07-25

## 2024-07-18 RX ORDER — ATOGEPANT 60 MG/1
1 TABLET ORAL DAILY
COMMUNITY
Start: 2024-07-17 | End: 2025-07-17

## 2024-07-18 RX ORDER — ATORVASTATIN CALCIUM 40 MG/1
40 TABLET, FILM COATED ORAL
COMMUNITY
Start: 2024-07-03

## 2024-07-18 RX ORDER — MAGNESIUM CITRATE
325 POWDER (GRAM) MISCELLANEOUS
COMMUNITY

## 2024-07-18 RX ORDER — FAMOTIDINE 40 MG/1
1 TABLET, FILM COATED ORAL NIGHTLY
COMMUNITY
Start: 2024-07-11 | End: 2024-10-09

## 2024-07-18 RX ORDER — METFORMIN HYDROCHLORIDE 500 MG/1
1 TABLET ORAL 2 TIMES DAILY WITH MEALS
COMMUNITY
Start: 2024-07-10 | End: 2024-10-08

## 2024-07-18 NOTE — PROGRESS NOTES
[facility] [address]  Cardiology Clinic Note      Chief Complaint  Chief Complaint   Patient presents with    Hypertension    Tingling     Right lower extremity, right wrist and hand       HPI:      59-year-old male with past medical history PSA, prostate cancer, peptic ulcer disease, prior tobacco, hypertension, hyperlipidemia, diabetes, venous insufficiency status post greater saphenous vein ablation, lower extremity arterial duplex  no hemodynamically significant stenosis, Biotronik loop recorder placed in [place] years ago the patient does not know of indication possibly presyncope which is not occurred in years monitor removed 2023 no arrhythmia reported, coronary artery calcification exercise treadmill test  negative for ischemia sensitivity reduced due to failure to reach target heart rate, echocardiogram  normal EF normal diastolic function normal RV normal CVP    Asymptomatic from a cardiovascular perspective  The patient states that he gets tingling in his right arm and right foot  Pulses are normal  There was no edema  Suggest follow up with Neurology  Normotensive not taking any antihypertensive medication    Medications  Current Outpatient Medications   Medication Sig Dispense Refill    aspirin (ECOTRIN) 81 MG EC tablet Take 81 mg by mouth once daily. On hold this week due to Botox for migranes scheduled      atorvastatin (LIPITOR) 40 MG tablet Take 40 mg by mouth.      blood sugar diagnostic Strp To check BG daily, to use with insurance preferred meter 30 each 11    blood-glucose meter kit To check BG  daily, to use with insurance preferred meter 1 each 0    BOTOX 200 unit SolR SMARTSI Unit(s) injection Every 12 Weeks      cholecalciferol, vitamin D3, (VITAMIN D3) 50 mcg (2,000 unit) Cap capsule Take 1 capsule by mouth once daily.      EMGALITY  mg/mL PnIj Inject into the skin every 30 days. Between -      empagliflozin (JARDIANCE) 25 mg tablet Take 1 tablet  (25 mg total) by mouth once daily. 30 tablet 6    famotidine (PEPCID) 40 MG tablet Take 1 tablet by mouth every evening.      fish oil-omega-3 fatty acids 300-1,000 mg capsule Take 1 g by mouth once daily. On hold this week for procedure      hydrocortisone (ANUSOL-HC) 25 mg suppository Place 25 mg rectally 2 (two) times daily as needed.      ibuprofen (ADVIL,MOTRIN) 800 MG tablet Take 800 mg by mouth 3 (three) times daily. PRN      lancets Misc To check BG  daily, to use with insurance preferred meter 30 each 11    lancets Misc 1 each by Other route.      magnesium citrate, bulk, Powd 325 mg by Other route.      metFORMIN (GLUCOPHAGE) 500 MG tablet Take 1 tablet by mouth 2 (two) times daily with meals.      multivitamin capsule Take 1 capsule by mouth once daily.      ONETOUCH ULTRA2 METER Misc USE TO CHECK BLOOD SUGAR      QULIPTA 60 mg Tab Take 1 tablet by mouth once daily.      solifenacin (VESICARE) 10 MG tablet Take 1 tablet (10 mg total) by mouth once daily. 90 tablet 3    tadalafiL (CIALIS) 5 MG tablet Take 1 tablet (5 mg total) by mouth daily as needed for Erectile Dysfunction. 90 tablet 3    tirzepatide 10 mg/0.5 mL PnIj Inject 10 mg into the skin every 7 days. 4 Pen 6    topiramate (TOPAMAX) 100 MG tablet Take 100 mg by mouth 2 (two) times daily.      UBRELVY 100 mg tablet Take 100 mg by mouth as needed.      VITAMIN B COMPLEX ORAL Take 1 tablet by mouth once daily.      amitriptyline (ELAVIL) 10 MG tablet Take 20 mg by mouth every evening. 10-30 mg at night (Patient not taking: Reported on 6/26/2024)      azelastine (ASTELIN) 137 mcg (0.1 %) nasal spray 1 spray 2 (two) times daily. (Patient not taking: Reported on 6/26/2024)      cyanocobalamin 1,000 mcg/mL injection Inject 1,000 mcg into the muscle every 30 days. (Patient not taking: Reported on 6/10/2024)      fluticasone propionate (FLONASE) 50 mcg/actuation nasal spray 1 spray by Each Nostril route 2 (two) times daily. (Patient not taking: Reported  on 2024)      hydrOXYzine HCL (ATARAX) 25 MG tablet TAKE 1 TAB EVERY 8 HOURS AS NEEDED FOR ITCHING (START AT NIGHT TO MINIMIZE SIDE EFFECTS. SEEK CARE FOR ANY SIDE EFFECTS.) FOR UP TO 7 DAYS. DO NOT TAKE WITH ZOFRAN (Patient not taking: Reported on 2024)      metFORMIN (GLUCOPHAGE) 1000 MG tablet Take 1 tablet (1,000 mg total) by mouth daily with breakfast. 90 tablet 2    ofloxacin (FLOXIN) 0.3 % otic solution SMARTSI Drop(s) In Ear(s) Every 12 Hours (Patient not taking: Reported on 6/10/2024)      ondansetron (ZOFRAN) 4 MG tablet Take 4 mg by mouth every 8 (eight) hours as needed. (Patient not taking: Reported on 6/10/2024)      pantoprazole (PROTONIX) 40 MG tablet Take 1 tablet (40 mg total) by mouth once daily. (Patient not taking: Reported on 6/10/2024) 30 tablet 11    phenazopyridine (PYRIDIUM) 100 MG tablet Take 1 tablet (100 mg total) by mouth 3 (three) times daily as needed for Pain. (Patient not taking: Reported on 2024) 30 tablet 5    sildenafiL (VIAGRA) 100 MG tablet Take 1 tablet (100 mg total) by mouth daily as needed for Erectile Dysfunction. (Patient not taking: Reported on 6/10/2024) 20 tablet 11    tamsulosin (FLOMAX) 0.4 mg Cap Take 1 capsule (0.4 mg total) by mouth once daily. 30 capsule 11    UBRELVY 50 mg tablet Take 50 mg by mouth as needed.      valACYclovir (VALTREX) 1000 MG tablet Take 1,000 mg by mouth 2 (two) times daily. (Patient not taking: Reported on 2024)       No current facility-administered medications for this visit.     Facility-Administered Medications Ordered in Other Visits   Medication Dose Route Frequency Provider Last Rate Last Admin    lactated ringers infusion   Intravenous Continuous Floyd Schuster MD        lactated ringers infusion   Intravenous Continuous Floyd Schuster MD        sodium chloride 0.9% flush 10 mL  10 mL Intravenous PRN Flyod Schuster MD        sodium chloride 0.9% flush 3 mL  3 mL Intravenous PRN Floyd Schuster MD             History  Past Medical History:   Diagnosis Date    Anxiety     BPH (benign prostatic hyperplasia)     Depression     Diabetes mellitus, type 2     ED (erectile dysfunction)     Enlarged prostate     Hypertension     Migraines     Mixed hyperlipidemia     Peptic ulcer     Personal history of colonic polyps     Personal history of colonic polyps 10/06/2023     Past Surgical History:   Procedure Laterality Date    broken arm  Right 1976    COLONOSCOPY  2016    COLONOSCOPY W/ POLYPECTOMY  10/06/2023    COLONOSCOPY, WITH POLYPECTOMY USING SNARE N/A 10/6/2023    Procedure: COLONOSCOPY, WITH POLYPECTOMY USING SNARE;  Surgeon: Floyd Schuster MD;  Location: [facility];  Service: Endoscopy;  Laterality: N/A;    EGD, WITH CLOSED BIOPSY  09/22/2023    ELBOW DEBRIDEMENT Left 2013    bone spur     ESOPHAGOGASTRODUODENOSCOPY N/A 09/22/2023    Procedure: EGD (ESOPHAGOGASTRODUODENOSCOPY);  Surgeon: Floyd Schuster MD;  Location: [facility];  Service: Endoscopy;  Laterality: N/A;    KNEE CARTILAGE SURGERY Right 1997    NECK SURGERY  2016    c4 c5 c6 plate     PROSTATE SURGERY      staples put in prostate    REMOVAL OF IMPLANTABLE LOOP RECORDER Left 04/05/2023    Procedure: REMOVAL, IMPLANTABLE LOOP RECORDER;  Surgeon: Cristóbal Becker MD;  Location: [facility] CATH LAB;  Service: Cardiology;  Laterality: Left;    SHOULDER ARTHROSCOPY Right 2015    reconstruction     THUMB ARTHROSCOPY Right 2015    reconstructive     TONSILLECTOMY  1969     Social History     Socioeconomic History    Marital status: Single   Tobacco Use    Smoking status: Former     Current packs/day: 0.30     Types: Cigarettes    Smokeless tobacco: Never   Substance and Sexual Activity    Alcohol use: No     Comment: hx of     Drug use: Not Currently     Types: Cocaine     Comment: hx of     Sexual activity: Not Currently     Partners: Female   Social History Narrative    ** Merged History Encounter **          Social Determinants of Health     Financial Resource  Strain: Medium Risk (7/14/2024)    Overall Financial Resource Strain (CARDIA)     Difficulty of Paying Living Expenses: Somewhat hard   Food Insecurity: Food Insecurity Present (7/14/2024)    Hunger Vital Sign     Worried About Running Out of Food in the Last Year: Never true     Ran Out of Food in the Last Year: Sometimes true   Transportation Needs: Patient Declined (5/14/2024)    Received from [facility] - Transportation     Lack of Transportation (Medical): Patient declined     Lack of Transportation (Non-Medical): Patient declined   Physical Activity: Unknown (7/14/2024)    Exercise Vital Sign     Days of Exercise per Week: 3 days   Stress: Stress Concern Present (7/14/2024)    [employer] - Occupational Stress Questionnaire     Feeling of Stress : To some extent   Housing Stability: Unknown (7/14/2024)    Housing Stability Vital Sign     Unable to Pay for Housing in the Last Year: No     Family History   Problem Relation Name Age of Onset    Cancer Mother      Diabetes Father      Cancer Brother      Heart disease Brother          Allergies  Review of patient's allergies indicates:   Allergen Reactions    Pcn [penicillins]        Review of Systems   ROS    Physical Exam  Vitals:    07/18/24 1531   BP: 107/69   Pulse: 65     Wt Readings from Last 1 Encounters:   07/18/24 85.1 kg (187 lb 9.8 oz)     Physical Exam    Labs  Lab Visit on 06/08/2024   Component Date Value Ref Range Status    Urine Culture, Routine 06/08/2024 No growth   Final   Lab Visit on 02/16/2024   Component Date Value Ref Range Status    Urine Culture, Routine 02/16/2024 No growth   Final   Office Visit on 02/15/2024   Component Date Value Ref Range Status    POC Residual Urine Volume 02/15/2024 0  0 - 100 mL Final       EKG  Reviewed prior    Echo   Results for orders placed or performed during the hospital encounter of 08/03/22   Echo   Result Value Ref Range    TDI SEPTAL 0.10 m/s    LV LATERAL E/E'  RATIO 5.58 m/s    LV SEPTAL E/E' RATIO 6.70 m/s    AORTIC VALVE CUSP SEPERATION 2.02 cm    TDI LATERAL 0.12 m/s    PV PEAK VELOCITY 1.12 cm/s    LVIDd 4.62 3.5 - 6.0 cm    IVS 1.11 (A) 0.6 - 1.1 cm    Posterior Wall 1.10 (A) 0.6 - 1.1 cm    Ao root annulus 3.83 cm    LVIDs 2.91 2.1 - 4.0 cm    FS 37 28 - 44 %    Ascending aorta 2.93 cm    LV mass 183.64 g    LA size 3.73 cm    RVDD 3.04 cm    Left Ventricle Relative Wall Thickness 0.48 cm    AV Velocity Ratio 0.86     MV valve area p 1/2 method 3.65 cm2    E/A ratio 1.03     Mean e' 0.11 m/s    E wave deceleration time 208.05 msec    Pulm vein S/D ratio 1.14     LVOT diameter 1.78 cm    LVOT area 2.5 cm2    LVOT peak andriy 1.23 m/s    LVOT peak VTI 22.88 cm    Ao peak andriy 1.43 m/s    LVOT stroke volume 56.91 cm3    AV peak gradient 8 mmHg    E/E' ratio 6.09 m/s    MV Peak E Andriy 0.67 m/s    MV stenosis pressure 1/2 time 60.33 ms    MV Peak A Andriy 0.65 m/s    PV Peak S Andriy 0.56 m/s    PV Peak D Andriy 0.49 m/s    LV Systolic Volume 32.38 mL    LV Diastolic Volume 98.24 mL    RA Major Axis 5.42 cm    Left Atrium Minor Axis 3.90 cm    Left Atrium Major Axis 6.12 cm    LA volume (mod) 54.85 cm3    Right Atrial Pressure (from IVC) 3 mmHg    EF 65 %    Narrative    · The left ventricle is normal in size with normal systolic function.  · The estimated ejection fraction is 65%.  · Normal left ventricular diastolic function.  · Normal right ventricular size with normal right ventricular systolic   function.  · Normal central venous pressure (3 mmHg).          Imaging  CT Renal Stone Study ABD Pelvis WO    Result Date: 6/20/2024  EXAMINATION: CT RENAL STONE STUDY ABD PELVIS WO CLINICAL HISTORY: right flank pain; TECHNIQUE: 5 mm axial images were obtained through the abdomen and pelvis without IV contrast.  Coronal and sagittal reformats were performed. COMPARISON: None. FINDINGS: Visualized heart demonstrates mild dense coronary artery atherosclerosis.  No pericardial effusion.  Visualized lungs are clear.  No pleural effusions. The liver is normal in size.  Hepatic parenchyma demonstrates homogeneous attenuation without focal lesions.  No intrahepatic bile duct dilatation. Gallbladder is unremarkable.  Common bile duct is normal in caliber. Stomach, duodenum, spleen and adrenal glands are normal. Mild dilatation of the pancreatic duct the level of the body measuring up to 4 mm.  No peripancreatic inflammatory changes or fluid collection.  No focal pancreatic lesions. Kidneys are normal in size and location.  3 cm low-attenuation right renal cortical lesion, likely a cyst.  Bilateral nonobstructing renal stones, two on the right and one on the left, with the largest measuring 0.4 cm and located within the interpolar region of the right kidney.  0.8 cm exophytic low-attenuation left renal lesion, likely a small cyst.  No hydronephrosis or hydroureter.  Bladder is fluid filled and unremarkable. Postsurgical changes of prostatic urethral lift.  The prostate is enlarged and demonstrates dystrophic calcifications. Small bowel is normal in caliber.  Moderate retained colonic fecal material.  The appendix is normal.  No obstruction or inflammatory changes. The abdominal aorta tapers normally with mild atherosclerotic calcification. No ascites or intra-abdominal free air.  No abdominal or pelvic lymph node enlargement.  No focal mesenteric masses. Mild diastasis recti.  Remaining visualized subcutaneous soft tissues appear within normal limits. Degenerative changes of the spine.  Grade 1 anterolisthesis of L4 on L5. No acute osseous abnormalities.  Lucent lesion with sclerotic borders at the left posterior acetabular column, likely a benign fibro-osseous lesion.     1. Bilateral nonobstructing renal stones.  No hydronephrosis or hydroureter. 2. Prostatomegaly.  Reported postsurgical changes of prostatic urethral lift. 3. Right and probable left renal cysts. 4. Aortic and coronary artery  atherosclerosis. Electronically signed by: Tung Carroll MD Date:    06/20/2024 Time:    07:17      Prior coronary angiogram / intervention:  None    Assessment and Plan  1. Chronic venous insufficiency of lower extremity  Status post greater saphenous vein ablation no symptoms no edema    2. Primary hypertension  Normotensive not taking any antihypertensives    3. Hyperlipidemia, unspecified hyperlipidemia type  Continue statin    4. Coronary artery calcification  Continue aspirin statin        Follow Up  Follow up in about 6 months (around 1/18/2025).      Davian Lancaster MD, [facility], [facility]  Interventional Cardiology     Total professional time spent for the encounter: 45 minutes  Time was spent preparing to see the patient, reviewing results of prior testing, obtaining and/or reviewing separately obtained history, performing a medically appropriate examination and interview, counseling and educating the patient/family, ordering medications/tests/procedures, referring and communicating with other health care professionals, documenting clinical information in the electronic health record, and independently interpreting results.

## 2024-08-28 ENCOUNTER — PATIENT MESSAGE (OUTPATIENT)
Dept: DIABETES | Facility: CLINIC | Age: 59
End: 2024-08-28
Payer: MEDICAID

## 2024-09-25 ENCOUNTER — PATIENT MESSAGE (OUTPATIENT)
Dept: DIABETES | Facility: CLINIC | Age: 59
End: 2024-09-25
Payer: MEDICAID

## 2024-10-14 DIAGNOSIS — R35.0 BENIGN PROSTATIC HYPERPLASIA WITH URINARY FREQUENCY: ICD-10-CM

## 2024-10-14 DIAGNOSIS — N40.1 BENIGN PROSTATIC HYPERPLASIA WITH URINARY FREQUENCY: ICD-10-CM

## 2024-10-14 DIAGNOSIS — N52.01 ERECTILE DYSFUNCTION DUE TO ARTERIAL INSUFFICIENCY: ICD-10-CM

## 2024-10-14 RX ORDER — TADALAFIL 5 MG/1
5 TABLET ORAL DAILY PRN
Qty: 90 TABLET | Refills: 3 | Status: SHIPPED | OUTPATIENT
Start: 2024-10-14 | End: 2025-10-14

## 2024-10-15 ENCOUNTER — PATIENT MESSAGE (OUTPATIENT)
Dept: DIABETES | Facility: CLINIC | Age: 59
End: 2024-10-15
Payer: MEDICAID

## 2024-10-28 ENCOUNTER — PATIENT MESSAGE (OUTPATIENT)
Dept: PRIMARY CARE CLINIC | Facility: CLINIC | Age: 59
End: 2024-10-28
Payer: MEDICAID

## 2024-11-20 ENCOUNTER — OFFICE VISIT (OUTPATIENT)
Dept: UROLOGY | Facility: CLINIC | Age: 59
End: 2024-11-20
Payer: MEDICAID

## 2024-11-20 VITALS
SYSTOLIC BLOOD PRESSURE: 126 MMHG | WEIGHT: 164.13 LBS | DIASTOLIC BLOOD PRESSURE: 74 MMHG | HEART RATE: 58 BPM | BODY MASS INDEX: 22.98 KG/M2 | HEIGHT: 71 IN

## 2024-11-20 DIAGNOSIS — N40.1 BENIGN PROSTATIC HYPERPLASIA WITH URINARY FREQUENCY: Primary | ICD-10-CM

## 2024-11-20 DIAGNOSIS — N32.81 OAB (OVERACTIVE BLADDER): ICD-10-CM

## 2024-11-20 DIAGNOSIS — R35.0 BENIGN PROSTATIC HYPERPLASIA WITH URINARY FREQUENCY: Primary | ICD-10-CM

## 2024-11-20 LAB — POC RESIDUAL URINE VOLUME: 124 ML (ref 0–100)

## 2024-11-20 PROCEDURE — 3061F NEG MICROALBUMINURIA REV: CPT | Mod: CPTII,,, | Performed by: NURSE PRACTITIONER

## 2024-11-20 PROCEDURE — 99215 OFFICE O/P EST HI 40 MIN: CPT | Mod: PBBFAC,PN | Performed by: NURSE PRACTITIONER

## 2024-11-20 PROCEDURE — 99214 OFFICE O/P EST MOD 30 MIN: CPT | Mod: S$PBB,,, | Performed by: NURSE PRACTITIONER

## 2024-11-20 PROCEDURE — 3074F SYST BP LT 130 MM HG: CPT | Mod: CPTII,,, | Performed by: NURSE PRACTITIONER

## 2024-11-20 PROCEDURE — 3008F BODY MASS INDEX DOCD: CPT | Mod: CPTII,,, | Performed by: NURSE PRACTITIONER

## 2024-11-20 PROCEDURE — 99999 PR PBB SHADOW E&M-EST. PATIENT-LVL V: CPT | Mod: PBBFAC,,, | Performed by: NURSE PRACTITIONER

## 2024-11-20 PROCEDURE — 3044F HG A1C LEVEL LT 7.0%: CPT | Mod: CPTII,,, | Performed by: NURSE PRACTITIONER

## 2024-11-20 PROCEDURE — 3078F DIAST BP <80 MM HG: CPT | Mod: CPTII,,, | Performed by: NURSE PRACTITIONER

## 2024-11-20 PROCEDURE — 1160F RVW MEDS BY RX/DR IN RCRD: CPT | Mod: CPTII,,, | Performed by: NURSE PRACTITIONER

## 2024-11-20 PROCEDURE — 3066F NEPHROPATHY DOC TX: CPT | Mod: CPTII,,, | Performed by: NURSE PRACTITIONER

## 2024-11-20 PROCEDURE — 99999PBSHW POCT BLADDER SCAN: Mod: PBBFAC,,,

## 2024-11-20 PROCEDURE — 51798 US URINE CAPACITY MEASURE: CPT | Mod: PBBFAC,PN | Performed by: NURSE PRACTITIONER

## 2024-11-20 PROCEDURE — 1159F MED LIST DOCD IN RCRD: CPT | Mod: CPTII,,, | Performed by: NURSE PRACTITIONER

## 2024-11-20 RX ORDER — MIRABEGRON 25 MG/1
25 TABLET, FILM COATED, EXTENDED RELEASE ORAL DAILY
Qty: 30 TABLET | Refills: 11 | Status: SHIPPED | OUTPATIENT
Start: 2024-11-20 | End: 2025-11-20

## 2024-11-20 NOTE — PROGRESS NOTES
"Subjective:      Reynaldo Chong is a 59 y.o. male who returns today regarding his BPH.    S/p urolift in [place] 2020   Currently taking Flomax and vesicare daily.   Presents today with c/o increase urgency, frequency and nocturia (up to 4x per night)    Denies GH, flank pain, weak/split stream      The following portions of the patient's history were reviewed and updated as appropriate: allergies, current medications, past family history, past medical history, past social history, past surgical history and problem list.    Review of Systems  A comprehensive multipoint review of systems was negative except as otherwise stated in the HPI.     Objective:   Vitals: /74 (BP Location: Left arm, Patient Position: Sitting)   Pulse (!) 58   Ht 5' 11" (1.803 m)   Wt 74.5 kg (164 lb 2.1 oz)   BMI 22.89 kg/m²     Physical Exam   General: alert and oriented, no acute distress  Respiratory: Symmetric expansion, non-labored breathing  Cardiovascular: regular rate and rhythm, no peripheral edema  Abdomen: soft, non distended  Genitourinary:defer   Skin: normal coloration and turgor, no rashes, no suspicious skin lesions noted  Neuro: no gross deficits  Psych: normal judgment and insight, normal mood/affect, and non-anxious    Lab Review   Urinalysis demonstrates no ua   Lab Results   Component Value Date    WBC 11.11 10/12/2024    HGB 16.1 10/12/2024    HCT 48.1 10/12/2024    MCV 92 10/12/2024     10/12/2024     Lab Results   Component Value Date    CREATININE 0.9 10/12/2024    BUN 15 10/12/2024     Lab Results   Component Value Date    PSA 0.38 05/31/2017    PSADIAG 0.48 03/08/2023       Imaging   CT ABDOMEN PELVIS WITHOUT CONTRAST     FINDINGS:  Heart: Trace coronary artery atherosclerosis.  No pericardial effusion.     Lungs: Lung bases are clear.  No pleural effusions.     Liver: Normal in size.  No focal lesions. No intrahepatic bile duct dilatation.     Gallbladder: Unremarkable.  Extra-hepatic bile ducts " are normal in caliber.     Stomach: Unremarkable.     Duodenum: Unremarkable.     Spleen: Normal in size. No focal lesions.     Pancreas: Unremarkable.     Adrenal glands: Unremarkable     Kidneys: Normal in size and location.  Bilateral renal cysts.  Bilateral nonobstructing renal stones measuring up to 0.4 cm.  No hydronephrosis or hydroureter.  Bladder is fluid filled and unremarkable.     Genital organs: Postsurgical changes of the prostate.No pelvic free fluid.     Bowel: Small bowel is normal in caliber.  4.7 cm segment of apparent mural thickening and luminal narrowing at the level of the cecum.  Moderate retained colonic fecal material.  The appendix is normal.  No obstruction or inflammatory changes.     Aorta: Mild aortoiliac atherosclerosis.  No aneurysm.     Peritoneum/mesentery: No ascites or intra-abdominal free air.  No focal mesenteric masses.     Lymph nodes: No lymphadenopathy.     Subcutaneous soft tissues: Unremarkable.     Osseous structures: Degenerative changes of the spine.  No osseous destructive processes.  No suspicious lytic or blastic lesions.     Impression:     1. Short segment of apparent mural thickening at the level of the cecum, possibly related to transient nondistention.  No pericolonic inflammatory change to suggest an inflammatory or infectious colitis.  A neoplastic process cannot be excluded noting that the patient had a negative colonoscopy on 10/06/2023.  No obstruction.  Normal appendix.  2. Bilateral nonobstructing renal stones.  3. Bilateral renal cysts.  4. Additional findings as detailed above.        Electronically signed by:Tung Carroll MD  Date:                                            10/12/2024  Time:                                           10:12         Bladder Scan Random: 124 cc        Assessment and Plan:   1. Benign prostatic hyperplasia with urinary frequency  2. OAB (overactive bladder)  --discussed cystoscope to evaluate prostate; pt wishes to defer  this until next year.   --he is interested in different medication therapy at this time  --stop vesicare; trial mirabegron   - mirabegron (MYRBETRIQ) 25 mg Tb24 ER tablet; Take 1 tablet (25 mg total) by mouth once daily.  Dispense: 30 tablet; Refill: 11  - Urine culture; Future    --rtc in 1 month (pt prefer January) with ua and PSA     This note is dictated on M*Modal word recognition program.  There are word recognition mistakes that are occasionally missed on review.

## 2024-12-09 ENCOUNTER — TELEPHONE (OUTPATIENT)
Dept: UROLOGY | Facility: CLINIC | Age: 59
End: 2024-12-09
Payer: MEDICAID

## 2024-12-09 DIAGNOSIS — N32.81 OAB (OVERACTIVE BLADDER): ICD-10-CM

## 2024-12-09 DIAGNOSIS — R35.0 BENIGN PROSTATIC HYPERPLASIA WITH URINARY FREQUENCY: Primary | ICD-10-CM

## 2024-12-09 DIAGNOSIS — N40.1 BENIGN PROSTATIC HYPERPLASIA WITH URINARY FREQUENCY: Primary | ICD-10-CM

## 2024-12-09 RX ORDER — VIBEGRON 75 MG/1
75 TABLET, FILM COATED ORAL DAILY
Qty: 30 TABLET | Refills: 11 | Status: SHIPPED | OUTPATIENT
Start: 2024-12-09

## 2024-12-09 NOTE — TELEPHONE ENCOUNTER
The mirabegron 25 mg Oral Daily has been causing night sweats. He stopped medication on Saturday, night sweats have subsided. Would like to see if there is something else you can send in to [facility] on [address] in [place]

## 2024-12-09 NOTE — TELEPHONE ENCOUNTER
----- Message from Clara sent at 12/9/2024  8:23 AM CST -----  Contact: [phone]@[email or web address]  Good morning patient would like a call back to discuss a med that he is having a problem with. Please call patient to advise  [phone]

## 2025-01-10 ENCOUNTER — TELEPHONE (OUTPATIENT)
Dept: PODIATRY | Facility: CLINIC | Age: 60
End: 2025-01-10
Payer: MEDICAID

## 2025-01-15 ENCOUNTER — OFFICE VISIT (OUTPATIENT)
Dept: UROLOGY | Facility: CLINIC | Age: 60
End: 2025-01-15
Payer: MEDICAID

## 2025-01-15 VITALS
BODY MASS INDEX: 24.08 KG/M2 | SYSTOLIC BLOOD PRESSURE: 127 MMHG | DIASTOLIC BLOOD PRESSURE: 77 MMHG | HEART RATE: 66 BPM | WEIGHT: 172.63 LBS

## 2025-01-15 DIAGNOSIS — R97.20 ABNORMAL PROSTATE SPECIFIC ANTIGEN (PSA): ICD-10-CM

## 2025-01-15 DIAGNOSIS — R35.0 BENIGN PROSTATIC HYPERPLASIA WITH URINARY FREQUENCY: Primary | ICD-10-CM

## 2025-01-15 DIAGNOSIS — N40.1 BENIGN PROSTATIC HYPERPLASIA WITH URINARY FREQUENCY: Primary | ICD-10-CM

## 2025-01-15 DIAGNOSIS — N32.81 OAB (OVERACTIVE BLADDER): ICD-10-CM

## 2025-01-15 LAB — POC RESIDUAL URINE VOLUME: 0 ML (ref 0–100)

## 2025-01-15 PROCEDURE — 1159F MED LIST DOCD IN RCRD: CPT | Mod: CPTII,,, | Performed by: NURSE PRACTITIONER

## 2025-01-15 PROCEDURE — 99999 PR PBB SHADOW E&M-EST. PATIENT-LVL V: CPT | Mod: PBBFAC,,, | Performed by: NURSE PRACTITIONER

## 2025-01-15 PROCEDURE — 3078F DIAST BP <80 MM HG: CPT | Mod: CPTII,,, | Performed by: NURSE PRACTITIONER

## 2025-01-15 PROCEDURE — 3008F BODY MASS INDEX DOCD: CPT | Mod: CPTII,,, | Performed by: NURSE PRACTITIONER

## 2025-01-15 PROCEDURE — 99999PBSHW POCT BLADDER SCAN: Mod: PBBFAC,,,

## 2025-01-15 PROCEDURE — 1160F RVW MEDS BY RX/DR IN RCRD: CPT | Mod: CPTII,,, | Performed by: NURSE PRACTITIONER

## 2025-01-15 PROCEDURE — 99214 OFFICE O/P EST MOD 30 MIN: CPT | Mod: S$PBB,,, | Performed by: NURSE PRACTITIONER

## 2025-01-15 PROCEDURE — 3074F SYST BP LT 130 MM HG: CPT | Mod: CPTII,,, | Performed by: NURSE PRACTITIONER

## 2025-01-15 PROCEDURE — 3044F HG A1C LEVEL LT 7.0%: CPT | Mod: CPTII,,, | Performed by: NURSE PRACTITIONER

## 2025-01-15 PROCEDURE — 99215 OFFICE O/P EST HI 40 MIN: CPT | Mod: PBBFAC,PN | Performed by: NURSE PRACTITIONER

## 2025-01-15 PROCEDURE — 51798 US URINE CAPACITY MEASURE: CPT | Mod: PBBFAC,PN | Performed by: NURSE PRACTITIONER

## 2025-01-15 NOTE — PROGRESS NOTES
Subjective:      Reynaldo Chong is a 59 y.o. male who returns today regarding his BPH.    S/p urolift in [place] 2020   Recently started mirabegron for bothersome urgency, frequency and nocturia. Patient stopped mirabegron and Flomax; denies bother with LUTS at this time.   Hx of ED; taking Cialis 5 mg daily     PSA 2.2, previously 0.48- reports ejaculation prior to lab collection   Denies GH, flank pain, weak/split stream    No family hx of prostate cancer     The following portions of the patient's history were reviewed and updated as appropriate: allergies, current medications, past family history, past medical history, past social history, past surgical history and problem list.    Review of Systems  A comprehensive multipoint review of systems was negative except as otherwise stated in the HPI.     Objective:   Vitals: /77 (BP Location: Left arm, Patient Position: Sitting)   Pulse 66   Wt 78.3 kg (172 lb 9.9 oz)   BMI 24.08 kg/m²     Physical Exam   General: alert and oriented, no acute distress  Respiratory: Symmetric expansion, non-labored breathing  Cardiovascular: regular rate and rhythm, no peripheral edema  Abdomen: soft, non distended  Genitourinary: declines   Skin: normal coloration and turgor, no rashes, no suspicious skin lesions noted  Neuro: no gross deficits  Psych: normal judgment and insight, normal mood/affect, and non-anxious    Lab Review   Urinalysis demonstrates no ua   Lab Results   Component Value Date    WBC 11.11 10/12/2024    HGB 16.1 10/12/2024    HCT 48.1 10/12/2024    MCV 92 10/12/2024     10/12/2024     Lab Results   Component Value Date    CREATININE 0.9 01/14/2025    BUN 14 01/14/2025     Lab Results   Component Value Date    PSA 0.38 05/31/2017    PSADIAG 2.2 01/14/2025    PSADIAG 0.48 03/08/2023       Imaging   CT ABDOMEN PELVIS WITHOUT CONTRAST     FINDINGS:  Heart: Trace coronary artery atherosclerosis.  No pericardial effusion.     Lungs: Lung bases are  clear.  No pleural effusions.     Liver: Normal in size.  No focal lesions. No intrahepatic bile duct dilatation.     Gallbladder: Unremarkable.  Extra-hepatic bile ducts are normal in caliber.     Stomach: Unremarkable.     Duodenum: Unremarkable.     Spleen: Normal in size. No focal lesions.     Pancreas: Unremarkable.     Adrenal glands: Unremarkable     Kidneys: Normal in size and location.  Bilateral renal cysts.  Bilateral nonobstructing renal stones measuring up to 0.4 cm.  No hydronephrosis or hydroureter.  Bladder is fluid filled and unremarkable.     Genital organs: Postsurgical changes of the prostate.No pelvic free fluid.     Bowel: Small bowel is normal in caliber.  4.7 cm segment of apparent mural thickening and luminal narrowing at the level of the cecum.  Moderate retained colonic fecal material.  The appendix is normal.  No obstruction or inflammatory changes.     Aorta: Mild aortoiliac atherosclerosis.  No aneurysm.     Peritoneum/mesentery: No ascites or intra-abdominal free air.  No focal mesenteric masses.     Lymph nodes: No lymphadenopathy.     Subcutaneous soft tissues: Unremarkable.     Osseous structures: Degenerative changes of the spine.  No osseous destructive processes.  No suspicious lytic or blastic lesions.     Impression:     1. Short segment of apparent mural thickening at the level of the cecum, possibly related to transient nondistention.  No pericolonic inflammatory change to suggest an inflammatory or infectious colitis.  A neoplastic process cannot be excluded noting that the patient had a negative colonoscopy on 10/06/2023.  No obstruction.  Normal appendix.  2. Bilateral nonobstructing renal stones.  3. Bilateral renal cysts.  4. Additional findings as detailed above.        Electronically signed by:Tung Carroll MD  Date:                                            10/12/2024  Time:                                           10:12         Bladder Scan: 0 cc         Assessment and Plan:   1. Benign prostatic hyperplasia with urinary frequency  2. OAB (overactive bladder)  --s/p Urolift  --no longer taking  Flomax or mirabegron; will monitor LUTS   --UC pending  --PSA 2.2; increased from 0.48; declined [facility]; will schedule MRI and repeat PSA after 3 days of abstinence     --will notify with results; if PSA elevated and UC negative will proceed with MRI      This note is dictated on M*Modal word recognition program.  There are word recognition mistakes that are occasionally missed on review.

## 2025-02-10 ENCOUNTER — HOSPITAL ENCOUNTER (OUTPATIENT)
Dept: RADIOLOGY | Facility: HOSPITAL | Age: 60
Discharge: HOME | End: 2025-02-10
Attending: NURSE PRACTITIONER
Payer: MEDICAID

## 2025-02-10 DIAGNOSIS — N40.1 BENIGN PROSTATIC HYPERPLASIA WITH URINARY FREQUENCY: ICD-10-CM

## 2025-02-10 DIAGNOSIS — R35.0 BENIGN PROSTATIC HYPERPLASIA WITH URINARY FREQUENCY: ICD-10-CM

## 2025-02-10 DIAGNOSIS — R97.20 ABNORMAL PROSTATE SPECIFIC ANTIGEN (PSA): ICD-10-CM

## 2025-02-10 PROCEDURE — 72197 MRI PELVIS W/O & W/DYE: CPT | Mod: 26,,, | Performed by: RADIOLOGY

## 2025-02-10 PROCEDURE — A9585 GADOBUTROL INJECTION: HCPCS | Performed by: NURSE PRACTITIONER

## 2025-02-10 PROCEDURE — 72197 MRI PELVIS W/O & W/DYE: CPT | Mod: TC

## 2025-02-10 PROCEDURE — 25500020 PHARM REV CODE 255: Performed by: NURSE PRACTITIONER

## 2025-02-10 RX ORDER — GADOBUTROL 604.72 MG/ML
10 INJECTION INTRAVENOUS
Status: COMPLETED | OUTPATIENT
Start: 2025-02-10 | End: 2025-02-10

## 2025-02-10 RX ADMIN — GADOBUTROL 10 ML: 604.72 INJECTION INTRAVENOUS at 05:02

## 2025-05-20 ENCOUNTER — TELEPHONE (OUTPATIENT)
Dept: DIABETES | Facility: CLINIC | Age: 60
End: 2025-05-20
Payer: MEDICAID

## 2025-05-20 ENCOUNTER — RESULTS FOLLOW-UP (OUTPATIENT)
Dept: DIABETES | Facility: CLINIC | Age: 60
End: 2025-05-20

## 2025-05-20 ENCOUNTER — PATIENT MESSAGE (OUTPATIENT)
Dept: DIABETES | Facility: CLINIC | Age: 60
End: 2025-05-20
Payer: MEDICAID